# Patient Record
Sex: FEMALE | Race: WHITE | Employment: UNEMPLOYED | URBAN - METROPOLITAN AREA
[De-identification: names, ages, dates, MRNs, and addresses within clinical notes are randomized per-mention and may not be internally consistent; named-entity substitution may affect disease eponyms.]

---

## 2018-02-26 PROCEDURE — 88175 CYTOPATH C/V AUTO FLUID REDO: CPT | Performed by: OBSTETRICS & GYNECOLOGY

## 2019-07-15 ENCOUNTER — OFFICE VISIT (OUTPATIENT)
Dept: INTEGRATIVE MEDICINE | Facility: CLINIC | Age: 39
End: 2019-07-15
Payer: COMMERCIAL

## 2019-07-15 DIAGNOSIS — Z71.0 PERSON ENCOUNTERING HEALTH SERVICES TO CONSULT ON BEHALF OF ANOTHER PERSON: Primary | ICD-10-CM

## 2019-07-15 NOTE — PROGRESS NOTES
Patient here for consultation at no charge. Discussed practice philosophy as well as treatment approach in detail. Patient to follow up to establish as a patient in this practice if desired.

## 2021-03-21 ENCOUNTER — HOSPITAL ENCOUNTER (OUTPATIENT)
Age: 41
Discharge: HOME | End: 2021-03-21
Attending: EMERGENCY MEDICINE
Payer: COMMERCIAL

## 2021-03-21 VITALS
HEART RATE: 93 BPM | RESPIRATION RATE: 18 BRPM | DIASTOLIC BLOOD PRESSURE: 78 MMHG | SYSTOLIC BLOOD PRESSURE: 125 MMHG | OXYGEN SATURATION: 100 % | TEMPERATURE: 98 F

## 2021-03-21 DIAGNOSIS — J01.00 ACUTE NON-RECURRENT MAXILLARY SINUSITIS: Primary | ICD-10-CM

## 2021-03-21 LAB — SARS-COV-2 RNA RESP QL NAA+PROBE: NOT DETECTED

## 2021-03-21 PROCEDURE — 99203 OFFICE O/P NEW LOW 30 MIN: CPT

## 2021-03-21 PROCEDURE — 99213 OFFICE O/P EST LOW 20 MIN: CPT

## 2021-03-21 RX ORDER — AMOXICILLIN AND CLAVULANATE POTASSIUM 875; 125 MG/1; MG/1
1 TABLET, FILM COATED ORAL 2 TIMES DAILY
Qty: 20 TABLET | Refills: 0 | Status: SHIPPED | OUTPATIENT
Start: 2021-03-21 | End: 2021-03-21

## 2021-03-21 RX ORDER — AMOXICILLIN AND CLAVULANATE POTASSIUM 875; 125 MG/1; MG/1
1 TABLET, FILM COATED ORAL 2 TIMES DAILY
Qty: 20 TABLET | Refills: 0 | Status: SHIPPED | OUTPATIENT
Start: 2021-03-21 | End: 2021-03-31

## 2021-03-21 NOTE — ED PROVIDER NOTES
Patient Seen in: [facility]      History   Patient presents with:  Sinus Problem    Stated Complaint: TL - sinus infection, pressure in right ear. April K    HPI/Subjective:   HPI    42 yo female with chronic sinus symptoms due to allergies. Oropharynx is clear. No posterior oropharyngeal erythema. Eyes:      Conjunctiva/sclera: Conjunctivae normal.      Pupils: Pupils are equal, round, and reactive to light. Cardiovascular:      Rate and Rhythm: Normal rate and regular rhythm.    Pulmonary

## 2021-03-21 NOTE — ED INITIAL ASSESSMENT (HPI)
States she has a long history of seasonal allergies and sinus problems. 1 week of sinus congestion, frontal headache, and facial pressure. No fever. No dizziness. Taking sudafed with some relief. Declines covid testing at this time.

## 2021-06-08 ENCOUNTER — OFFICE VISIT (OUTPATIENT)
Dept: INTEGRATIVE MEDICINE | Facility: CLINIC | Age: 41
End: 2021-06-08
Payer: COMMERCIAL

## 2021-06-08 VITALS
HEIGHT: 61 IN | OXYGEN SATURATION: 98 % | HEART RATE: 90 BPM | SYSTOLIC BLOOD PRESSURE: 132 MMHG | BODY MASS INDEX: 28.17 KG/M2 | WEIGHT: 149.19 LBS | DIASTOLIC BLOOD PRESSURE: 80 MMHG

## 2021-06-08 DIAGNOSIS — Z00.00 ROUTINE MEDICAL EXAM: Primary | ICD-10-CM

## 2021-06-08 DIAGNOSIS — E66.3 OVERWEIGHT (BMI 25.0-29.9): ICD-10-CM

## 2021-06-08 DIAGNOSIS — J30.1 SEASONAL ALLERGIC RHINITIS DUE TO POLLEN: ICD-10-CM

## 2021-06-08 PROCEDURE — 3008F BODY MASS INDEX DOCD: CPT | Performed by: FAMILY MEDICINE

## 2021-06-08 PROCEDURE — 99386 PREV VISIT NEW AGE 40-64: CPT | Performed by: FAMILY MEDICINE

## 2021-06-08 PROCEDURE — 3075F SYST BP GE 130 - 139MM HG: CPT | Performed by: FAMILY MEDICINE

## 2021-06-08 PROCEDURE — 3079F DIAST BP 80-89 MM HG: CPT | Performed by: FAMILY MEDICINE

## 2021-06-08 NOTE — PATIENT INSTRUCTIONS
I have complete jodi in the body's ability to heal and transform. The products and items listed below (the “Products”)  and their claims have not been evaluated by the Food and Drug Administration.  Dietary products are not intended to treat, prevent, m Extract Nasal Spray:    Directions: 2 sprays three times daily  Why: to rebuild healthy roxanna in the nasal and sinus passages  Where: [employer] or online  YouTube: [employer], Yoga with Jean Claude Rodas, Yoga with Irene Fong exercis more!) that you are thankful for everyday. Life never seems as bad when you realize how much you have to be thankful for. · Focus on positive things in your life.  You are a powerful creator in your life and when you focus on the positive you will be yesy a total of four breaths. ARE THE NUMBERS IMPORTANT? The absolute time you spend on each phase is not important; the ratio of 4:7:8 is important.  If you have trouble holding your breath, speed the exercise up but keep to the ratio of 4:7:8 for the thr

## 2021-06-08 NOTE — PROGRESS NOTES
Ada Cantu is a 35year old female. Patient presents with:  New Patient  Establish Care      HPI:     Concerned about Candida. Has IBS. More sinus. IBS symptoms come and go. Takes probiotics which help. Acts up with stress or spicy foods.  Will Administered   • FLUMIST NASAL 2 YR-49 YRS (52729) 12/15/2014   • Influenza 10/08/2013   • Influenza Virus Vaccine, H1N1 (BAD=[id number]), [facility] 11/12/2009   • TDAP 01/30/2014       MEDICAL HISTORY:     Past Medical History:   Diagnosis Date   • SEASONAL A Physical Activity:       Days of Exercise per Week:       Minutes of Exercise per Session:   Stress:       Feeling of Stress :   Social Connections:       Frequency of Communication with Friends and Family:       Frequency of Social Gatherings with Friends Mental Status: She is alert and oriented to person, place, and time. Cranial Nerves: No cranial nerve deficit. Gait: Gait is intact.    Psychiatric:         Mood and Affect: Mood and affect normal.         Cognition and Memory: Memory normal. liable for the patients use of the Products. [facility] makes no representations or warranties of any kind, expressed or implied, as to the Products, including, but not limited to its efficacy, benefits or outcomes.   Patient agrees to contact his/her healthcare p in silence, meditation or deep breathing; If you can fit in taking a shower and brushing your teeth, then you can fit in this quiet time for yourself. · Find a special, quiet place to sit. · Start by sitting in a comfortable position. · Just BREATHE!  On present moment, I know this is a wonderful moment.”    Keep a gratitude journal of 5 things that you are grateful for every day.      [facility] one word to repeat out loud at first, then quieter and quieter until only mouthing th Stop, Breathe and Think  Pranayama  Insight Timer  Breathe to Relax  Calm  Simple Habit  10% Happier  [employer]    Check out these books:  Conscious Breathing: Breathwork for Health, Stress Release, and Personal Mastery by Flory Mitchell    The [employer]acle [employer] Mi 19.68

## 2021-06-15 ENCOUNTER — LAB ENCOUNTER (OUTPATIENT)
Dept: LAB | Facility: REFERENCE LAB | Age: 41
End: 2021-06-15
Attending: FAMILY MEDICINE
Payer: COMMERCIAL

## 2021-06-15 DIAGNOSIS — J30.1 SEASONAL ALLERGIC RHINITIS DUE TO POLLEN: ICD-10-CM

## 2021-06-15 DIAGNOSIS — Z00.00 ROUTINE MEDICAL EXAM: ICD-10-CM

## 2021-06-15 PROCEDURE — 85025 COMPLETE CBC W/AUTO DIFF WBC: CPT

## 2021-06-15 PROCEDURE — 80061 LIPID PANEL: CPT

## 2021-06-15 PROCEDURE — 80053 COMPREHEN METABOLIC PANEL: CPT

## 2021-06-15 PROCEDURE — 86628 CANDIDA ANTIBODY: CPT

## 2021-06-15 PROCEDURE — 84443 ASSAY THYROID STIM HORMONE: CPT

## 2021-06-15 PROCEDURE — 83036 HEMOGLOBIN GLYCOSYLATED A1C: CPT

## 2021-06-15 PROCEDURE — 82306 VITAMIN D 25 HYDROXY: CPT

## 2021-06-15 PROCEDURE — 36415 COLL VENOUS BLD VENIPUNCTURE: CPT

## 2021-06-23 ENCOUNTER — TELEPHONE (OUTPATIENT)
Dept: INTEGRATIVE MEDICINE | Facility: CLINIC | Age: 41
End: 2021-06-23

## 2021-06-23 NOTE — TELEPHONE ENCOUNTER
Pt is inquiring about her lab results completed last week and looking to see what the next best steps are. Pt is aware Dr. Anne Casey will respond as soon as possible. Scheduled pt for her follow up visit as well.  Pt wanted to complete the visit a little soone

## 2021-11-10 ENCOUNTER — TELEMEDICINE (OUTPATIENT)
Dept: INTEGRATIVE MEDICINE | Facility: CLINIC | Age: 41
End: 2021-11-10

## 2021-11-10 DIAGNOSIS — J30.1 SEASONAL ALLERGIC RHINITIS DUE TO POLLEN: Primary | ICD-10-CM

## 2021-11-10 DIAGNOSIS — N92.6 IRREGULAR PERIODS: ICD-10-CM

## 2021-11-10 DIAGNOSIS — K58.9 IRRITABLE BOWEL SYNDROME, UNSPECIFIED TYPE: ICD-10-CM

## 2021-11-10 PROCEDURE — 99214 OFFICE O/P EST MOD 30 MIN: CPT | Performed by: FAMILY MEDICINE

## 2021-11-10 NOTE — PROGRESS NOTES
Jannie Wong is a 35year old female. Patient presents with: Follow - Up      HPI:     Getting back into exercise, doing some yoga. Also running. Tried the natural nasal spray for her sinuses and it's been helping, not needing her Zyrtec.    Diges 2 months, then twice monthly thereafter.  120 mL 0   • FLUTICASONE PROPIONATE 50 MCG/ACT Nasal Suspension SPRAY TWICE INTO EACH NOSTRIL DAILY 1 Bottle 0       SOCIAL HISTORY:   Social History    Socioeconomic History      Marital status:       Spouse nerve deficit. Gait: Gait is intact.    Psychiatric:         Mood and Affect: Mood and affect normal.         Behavior: Behavior normal.         ASSESSMENT AND PLAN:     Office Visit on 09/09/2021   Component Date Value Ref Range Status   • DIAGNOSIS: Antibody to Candida                              albicans detected, which may                              indicate a current or past                              infection.     The best evidence for current infection is a significant   change on two approp certified laboratory and is intended for   clinical purposes. • Candida Antibody IgM 06/15/2021 0.49  <=0.88 EV Final    Comment: INTERPRETIVE INFORMATION: Candida Ab, IgM      0.88 EV or less: . ......  Negative - No significant level Calculated Osmolality 06/15/2021 288  275 - 295 mOsm/kg Final   • GFR, Non- 06/15/2021 108  >=60 Final   • GFR, -American 06/15/2021 125  >=60 Final   • ALT 06/15/2021 27  13 - 56 U/L Final   • AST 06/15/2021 19  15 - 37 U/L Final Interpretive Information:   An HDL cholesterol <40 mg/dL is low and constitutes a coronary heart disease risk factor. An HDL cholesterol >60 mg/dL is a negative risk factor for coronary heart disease.        • Triglycerides 06/15/2021 77  30 - 149 mg/dL Fin Lymphocyte Absolute 06/15/2021 2.74  1.00 - 4.00 x10(3) uL Final   • Monocyte Absolute 06/15/2021 0.42  0.10 - 1.00 x10(3) uL Final   • Eosinophil Absolute 06/15/2021 0.15  0.00 - 0.70 x10(3) uL Final   • Basophil Absolute 06/15/2021 0.04  0.00 - 0.20 x10(

## 2021-11-24 ENCOUNTER — TELEPHONE (OUTPATIENT)
Dept: INTEGRATIVE MEDICINE | Facility: CLINIC | Age: 41
End: 2021-11-24

## 2021-11-24 ENCOUNTER — HOSPITAL ENCOUNTER (OUTPATIENT)
Age: 41
Discharge: HOME | End: 2021-11-24
Payer: COMMERCIAL

## 2021-11-24 VITALS
SYSTOLIC BLOOD PRESSURE: 123 MMHG | BODY MASS INDEX: 26.43 KG/M2 | DIASTOLIC BLOOD PRESSURE: 82 MMHG | HEIGHT: 61 IN | RESPIRATION RATE: 20 BRPM | WEIGHT: 140 LBS | OXYGEN SATURATION: 99 % | HEART RATE: 100 BPM | TEMPERATURE: 98 F

## 2021-11-24 DIAGNOSIS — U07.1 COVID-19 VIRUS INFECTION: Primary | ICD-10-CM

## 2021-11-24 PROCEDURE — 87880 STREP A ASSAY W/OPTIC: CPT

## 2021-11-24 PROCEDURE — 99214 OFFICE O/P EST MOD 30 MIN: CPT

## 2021-11-24 RX ORDER — ACETAMINOPHEN 325 MG/1
650 TABLET ORAL
Qty: 40 TABLET | Refills: 0 | Status: SHIPPED | OUTPATIENT
Start: 2021-11-24

## 2021-11-24 NOTE — TELEPHONE ENCOUNTER
Left vm following with s/p antibody infusion. I will try to reach her on Friday. in the meantime, to go to ER or call 911 if any sob, CP, worst HA of her life.

## 2021-11-24 NOTE — TELEPHONE ENCOUNTER
Received adonay back from pt, feels like she has a cold - sore throat, HA, body aches. No fever. UC gave option for antibody infusion. Reviewed and discussed s/s to go to ER. Discussed symptomatic treatment. Caution with cough and cold OTC options - do not take additional tylenol. Currently taking tylenol and advil for symptoms. I will send supplement immune boost via [employer]. Aware we are out of the office tomorrow. Back in the morning Friday if needed. No further questions at this time.

## 2021-11-24 NOTE — TELEPHONE ENCOUNTER
Pt received PAB infusion at [address] on 11/24 for COVID-19. Please follow-up with pt for post-infusion assessment and home monitoring if needed. Thank you.

## 2021-11-24 NOTE — ED PROVIDER NOTES
Patient Seen in: [facility]    History   Patient presents with:  Sore Throat  Cough/URI    Stated Complaint: sore throat congestion    HPI    49-year-old female presents with chief complaint of sore throat. Onset 4 days ago.   Patient reports Alcohol use: Yes      Alcohol/week: 0.0 standard drinks      Comment: occasionally,    Drug use: No      Review of Systems    Positive for stated complaint: sore throat congestion  Other systems are as noted in HPI.   Constitutional and vital signs reviewed lymphadenopathy noted. Musculoskeletal: Musculoskeletal system is grossly intact. There is no obvious deformity. Neurological: Gross motor movement is intact in all 4 extremities. Patient exhibits normal speech. Skin: Skin is normal to inspection.   Wa following up with their doctor as instructed. The patient verbalized understanding of the discharge instructions and plan.     Disposition and Plan     Clinical Impression:  COVID-19 virus infection  (primary encounter diagnosis)    Disposition:  Discharge

## 2021-11-24 NOTE — ED INITIAL ASSESSMENT (HPI)
4 days of fatigue, \"not feeling well\", congestion, sore throat, and body aches. Patient is unvaccinated for covid.

## 2022-06-09 ENCOUNTER — OFFICE VISIT (OUTPATIENT)
Dept: INTEGRATIVE MEDICINE | Facility: CLINIC | Age: 42
End: 2022-06-09
Payer: COMMERCIAL

## 2022-06-09 VITALS
HEART RATE: 76 BPM | WEIGHT: 144 LBS | HEIGHT: 61 IN | DIASTOLIC BLOOD PRESSURE: 80 MMHG | BODY MASS INDEX: 27.19 KG/M2 | OXYGEN SATURATION: 99 % | SYSTOLIC BLOOD PRESSURE: 128 MMHG

## 2022-06-09 DIAGNOSIS — K58.9 IRRITABLE BOWEL SYNDROME, UNSPECIFIED TYPE: ICD-10-CM

## 2022-06-09 DIAGNOSIS — E66.3 OVERWEIGHT (BMI 25.0-29.9): ICD-10-CM

## 2022-06-09 DIAGNOSIS — Z00.00 ROUTINE MEDICAL EXAM: Primary | ICD-10-CM

## 2022-06-09 DIAGNOSIS — J30.1 SEASONAL ALLERGIC RHINITIS DUE TO POLLEN: ICD-10-CM

## 2022-06-09 PROCEDURE — 3008F BODY MASS INDEX DOCD: CPT | Performed by: FAMILY MEDICINE

## 2022-06-09 PROCEDURE — 3079F DIAST BP 80-89 MM HG: CPT | Performed by: FAMILY MEDICINE

## 2022-06-09 PROCEDURE — 3074F SYST BP LT 130 MM HG: CPT | Performed by: FAMILY MEDICINE

## 2022-06-09 PROCEDURE — 99396 PREV VISIT EST AGE 40-64: CPT | Performed by: FAMILY MEDICINE

## 2022-06-11 ENCOUNTER — LAB ENCOUNTER (OUTPATIENT)
Dept: LAB | Facility: REFERENCE LAB | Age: 42
End: 2022-06-11
Attending: FAMILY MEDICINE
Payer: COMMERCIAL

## 2022-06-11 DIAGNOSIS — Z00.00 ROUTINE MEDICAL EXAM: ICD-10-CM

## 2022-06-11 LAB
ALBUMIN SERPL-MCNC: 3.6 G/DL (ref 3.4–5)
ALBUMIN/GLOB SERPL: 1.1 {RATIO} (ref 1–2)
ALP LIVER SERPL-CCNC: 60 U/L
ALT SERPL-CCNC: 28 U/L
ANION GAP SERPL CALC-SCNC: 8 MMOL/L (ref 0–18)
AST SERPL-CCNC: 17 U/L (ref 15–37)
BASOPHILS # BLD AUTO: 0.03 X10(3) UL (ref 0–0.2)
BASOPHILS NFR BLD AUTO: 0.4 %
BILIRUB SERPL-MCNC: 0.8 MG/DL (ref 0.1–2)
BUN BLD-MCNC: 11 MG/DL (ref 7–18)
BUN/CREAT SERPL: 15.5 (ref 10–20)
CALCIUM BLD-MCNC: 8.9 MG/DL (ref 8.5–10.1)
CHLORIDE SERPL-SCNC: 107 MMOL/L (ref 98–112)
CHOLEST SERPL-MCNC: 208 MG/DL (ref ?–200)
CO2 SERPL-SCNC: 27 MMOL/L (ref 21–32)
CREAT BLD-MCNC: 0.71 MG/DL
DEPRECATED RDW RBC AUTO: 41 FL (ref 35.1–46.3)
EOSINOPHIL # BLD AUTO: 0.07 X10(3) UL (ref 0–0.7)
EOSINOPHIL NFR BLD AUTO: 1 %
ERYTHROCYTE [DISTWIDTH] IN BLOOD BY AUTOMATED COUNT: 11.7 % (ref 11–15)
EST. AVERAGE GLUCOSE BLD GHB EST-MCNC: 108 MG/DL (ref 68–126)
FASTING PATIENT LIPID ANSWER: YES
FASTING STATUS PATIENT QL REPORTED: YES
GLOBULIN PLAS-MCNC: 3.3 G/DL (ref 2.8–4.4)
GLUCOSE BLD-MCNC: 89 MG/DL (ref 70–99)
HBA1C MFR BLD: 5.4 % (ref ?–5.7)
HCT VFR BLD AUTO: 42.1 %
HDLC SERPL-MCNC: 71 MG/DL (ref 40–59)
HGB BLD-MCNC: 13.7 G/DL
IMM GRANULOCYTES # BLD AUTO: 0.02 X10(3) UL (ref 0–1)
IMM GRANULOCYTES NFR BLD: 0.3 %
LDLC SERPL CALC-MCNC: 123 MG/DL (ref ?–100)
LYMPHOCYTES # BLD AUTO: 2.76 X10(3) UL (ref 1–4)
LYMPHOCYTES NFR BLD AUTO: 40.6 %
MCH RBC QN AUTO: 31.1 PG (ref 26–34)
MCHC RBC AUTO-ENTMCNC: 32.5 G/DL (ref 31–37)
MCV RBC AUTO: 95.7 FL
MONOCYTES # BLD AUTO: 0.45 X10(3) UL (ref 0.1–1)
MONOCYTES NFR BLD AUTO: 6.6 %
NEUTROPHILS # BLD AUTO: 3.47 X10 (3) UL (ref 1.5–7.7)
NEUTROPHILS # BLD AUTO: 3.47 X10(3) UL (ref 1.5–7.7)
NEUTROPHILS NFR BLD AUTO: 51.1 %
NONHDLC SERPL-MCNC: 137 MG/DL (ref ?–130)
OSMOLALITY SERPL CALC.SUM OF ELEC: 293 MOSM/KG (ref 275–295)
PLATELET # BLD AUTO: 299 10(3)UL (ref 150–450)
POTASSIUM SERPL-SCNC: 4.2 MMOL/L (ref 3.5–5.1)
PROT SERPL-MCNC: 6.9 G/DL (ref 6.4–8.2)
RBC # BLD AUTO: 4.4 X10(6)UL
SODIUM SERPL-SCNC: 142 MMOL/L (ref 136–145)
TRIGL SERPL-MCNC: 80 MG/DL (ref 30–149)
TSI SER-ACNC: 1.35 MIU/ML (ref 0.36–3.74)
VIT D+METAB SERPL-MCNC: 66.4 NG/ML (ref 30–100)
VLDLC SERPL CALC-MCNC: 14 MG/DL (ref 0–30)
WBC # BLD AUTO: 6.8 X10(3) UL (ref 4–11)

## 2022-06-11 PROCEDURE — 36415 COLL VENOUS BLD VENIPUNCTURE: CPT

## 2022-06-11 PROCEDURE — 86628 CANDIDA ANTIBODY: CPT

## 2022-06-11 PROCEDURE — 80061 LIPID PANEL: CPT

## 2022-06-11 PROCEDURE — 85025 COMPLETE CBC W/AUTO DIFF WBC: CPT

## 2022-06-11 PROCEDURE — 80053 COMPREHEN METABOLIC PANEL: CPT

## 2022-06-11 PROCEDURE — 82306 VITAMIN D 25 HYDROXY: CPT

## 2022-06-11 PROCEDURE — 83036 HEMOGLOBIN GLYCOSYLATED A1C: CPT

## 2022-06-11 PROCEDURE — 84443 ASSAY THYROID STIM HORMONE: CPT

## 2022-06-15 LAB
CANDIDA ANTIBODY IGA: 0.4 EV
CANDIDA ANTIBODY IGG: 1.15 EV
CANDIDA ANTIBODY IGM: 0.42 EV

## 2022-11-30 ENCOUNTER — TELEMEDICINE (OUTPATIENT)
Dept: FAMILY MEDICINE CLINIC | Facility: CLINIC | Age: 42
End: 2022-11-30
Payer: COMMERCIAL

## 2022-11-30 DIAGNOSIS — J01.00 ACUTE MAXILLARY SINUSITIS, RECURRENCE NOT SPECIFIED: Primary | ICD-10-CM

## 2022-11-30 RX ORDER — AMOXICILLIN AND CLAVULANATE POTASSIUM 875; 125 MG/1; MG/1
1 TABLET, FILM COATED ORAL 2 TIMES DAILY
Qty: 14 TABLET | Refills: 0 | Status: SHIPPED | OUTPATIENT
Start: 2022-11-30 | End: 2022-12-07

## 2023-01-01 ENCOUNTER — HOSPITAL ENCOUNTER (OUTPATIENT)
Age: 43
Discharge: HOME | End: 2023-01-01
Attending: EMERGENCY MEDICINE
Payer: COMMERCIAL

## 2023-01-01 VITALS
DIASTOLIC BLOOD PRESSURE: 78 MMHG | RESPIRATION RATE: 18 BRPM | TEMPERATURE: 98 F | HEART RATE: 88 BPM | SYSTOLIC BLOOD PRESSURE: 127 MMHG | OXYGEN SATURATION: 100 %

## 2023-01-01 DIAGNOSIS — N30.01 ACUTE CYSTITIS WITH HEMATURIA: Primary | ICD-10-CM

## 2023-01-01 LAB
B-HCG UR QL: NEGATIVE
BILIRUB UR QL STRIP: NEGATIVE
COLOR UR: YELLOW
GLUCOSE UR STRIP-MCNC: NEGATIVE MG/DL
KETONES UR STRIP-MCNC: NEGATIVE MG/DL
NITRITE UR QL STRIP: NEGATIVE
PH UR STRIP: 8.5 [PH]
PROT UR STRIP-MCNC: 30 MG/DL
SP GR UR STRIP: 1.02
UROBILINOGEN UR STRIP-ACNC: <2 MG/DL

## 2023-01-01 PROCEDURE — 81002 URINALYSIS NONAUTO W/O SCOPE: CPT

## 2023-01-01 PROCEDURE — 81025 URINE PREGNANCY TEST: CPT

## 2023-01-01 PROCEDURE — 99214 OFFICE O/P EST MOD 30 MIN: CPT

## 2023-01-01 PROCEDURE — 99213 OFFICE O/P EST LOW 20 MIN: CPT

## 2023-01-01 RX ORDER — PHENAZOPYRIDINE HYDROCHLORIDE 200 MG/1
200 TABLET, FILM COATED ORAL 3 TIMES DAILY PRN
Qty: 6 TABLET | Refills: 0 | Status: SHIPPED | OUTPATIENT
Start: 2023-01-01 | End: 2023-01-03

## 2023-01-01 RX ORDER — SULFAMETHOXAZOLE AND TRIMETHOPRIM 800; 160 MG/1; MG/1
1 TABLET ORAL 2 TIMES DAILY
Qty: 6 TABLET | Refills: 0 | Status: SHIPPED | OUTPATIENT
Start: 2023-01-01 | End: 2023-01-04

## 2023-06-05 ENCOUNTER — OFFICE VISIT (OUTPATIENT)
Dept: INTEGRATIVE MEDICINE | Facility: CLINIC | Age: 43
End: 2023-06-05
Payer: COMMERCIAL

## 2023-06-05 VITALS
HEART RATE: 63 BPM | OXYGEN SATURATION: 98 % | DIASTOLIC BLOOD PRESSURE: 80 MMHG | BODY MASS INDEX: 28 KG/M2 | SYSTOLIC BLOOD PRESSURE: 116 MMHG | WEIGHT: 146 LBS

## 2023-06-05 DIAGNOSIS — J30.1 SEASONAL ALLERGIC RHINITIS DUE TO POLLEN: ICD-10-CM

## 2023-06-05 DIAGNOSIS — B37.82 INTESTINAL CANDIDIASIS: ICD-10-CM

## 2023-06-05 DIAGNOSIS — Z00.00 ROUTINE MEDICAL EXAM: Primary | ICD-10-CM

## 2023-06-05 RX ORDER — METRONIDAZOLE 10 MG/G
GEL TOPICAL
COMMUNITY
Start: 2023-04-18

## 2023-06-05 RX ORDER — KETOCONAZOLE 20 MG/ML
SHAMPOO TOPICAL
COMMUNITY
Start: 2023-04-17

## 2023-06-19 ENCOUNTER — LAB ENCOUNTER (OUTPATIENT)
Dept: LAB | Facility: REFERENCE LAB | Age: 43
End: 2023-06-19
Attending: FAMILY MEDICINE
Payer: COMMERCIAL

## 2023-06-19 DIAGNOSIS — B37.82 INTESTINAL CANDIDIASIS: ICD-10-CM

## 2023-06-19 DIAGNOSIS — Z00.00 ROUTINE MEDICAL EXAM: ICD-10-CM

## 2023-06-19 LAB
ALBUMIN SERPL-MCNC: 3.6 G/DL (ref 3.4–5)
ALBUMIN/GLOB SERPL: 1.1 {RATIO} (ref 1–2)
ALP LIVER SERPL-CCNC: 44 U/L
ALT SERPL-CCNC: 26 U/L
ANION GAP SERPL CALC-SCNC: 4 MMOL/L (ref 0–18)
AST SERPL-CCNC: 18 U/L (ref 15–37)
BASOPHILS # BLD AUTO: 0.04 X10(3) UL (ref 0–0.2)
BASOPHILS NFR BLD AUTO: 0.6 %
BILIRUB SERPL-MCNC: 0.7 MG/DL (ref 0.1–2)
BUN BLD-MCNC: 14 MG/DL (ref 7–18)
BUN/CREAT SERPL: 18.2 (ref 10–20)
CALCIUM BLD-MCNC: 8.7 MG/DL (ref 8.5–10.1)
CHLORIDE SERPL-SCNC: 108 MMOL/L (ref 98–112)
CHOLEST SERPL-MCNC: 188 MG/DL (ref ?–200)
CO2 SERPL-SCNC: 27 MMOL/L (ref 21–32)
CREAT BLD-MCNC: 0.77 MG/DL
DEPRECATED RDW RBC AUTO: 41.2 FL (ref 35.1–46.3)
EOSINOPHIL # BLD AUTO: 0.12 X10(3) UL (ref 0–0.7)
EOSINOPHIL NFR BLD AUTO: 1.9 %
ERYTHROCYTE [DISTWIDTH] IN BLOOD BY AUTOMATED COUNT: 11.9 % (ref 11–15)
EST. AVERAGE GLUCOSE BLD GHB EST-MCNC: 100 MG/DL (ref 68–126)
FASTING PATIENT LIPID ANSWER: YES
FASTING STATUS PATIENT QL REPORTED: YES
GFR SERPLBLD BASED ON 1.73 SQ M-ARVRAT: 97 ML/MIN/1.73M2 (ref 60–?)
GLOBULIN PLAS-MCNC: 3.4 G/DL (ref 2.8–4.4)
GLUCOSE BLD-MCNC: 90 MG/DL (ref 70–99)
HBA1C MFR BLD: 5.1 % (ref ?–5.7)
HCT VFR BLD AUTO: 42 %
HDLC SERPL-MCNC: 57 MG/DL (ref 40–59)
HGB BLD-MCNC: 13.6 G/DL
IMM GRANULOCYTES # BLD AUTO: 0.01 X10(3) UL (ref 0–1)
IMM GRANULOCYTES NFR BLD: 0.2 %
LDLC SERPL CALC-MCNC: 119 MG/DL (ref ?–100)
LYMPHOCYTES # BLD AUTO: 2.78 X10(3) UL (ref 1–4)
LYMPHOCYTES NFR BLD AUTO: 44.1 %
MCH RBC QN AUTO: 30.4 PG (ref 26–34)
MCHC RBC AUTO-ENTMCNC: 32.4 G/DL (ref 31–37)
MCV RBC AUTO: 94 FL
MONOCYTES # BLD AUTO: 0.48 X10(3) UL (ref 0.1–1)
MONOCYTES NFR BLD AUTO: 7.6 %
NEUTROPHILS # BLD AUTO: 2.88 X10 (3) UL (ref 1.5–7.7)
NEUTROPHILS # BLD AUTO: 2.88 X10(3) UL (ref 1.5–7.7)
NEUTROPHILS NFR BLD AUTO: 45.6 %
NONHDLC SERPL-MCNC: 131 MG/DL (ref ?–130)
OSMOLALITY SERPL CALC.SUM OF ELEC: 288 MOSM/KG (ref 275–295)
PLATELET # BLD AUTO: 212 10(3)UL (ref 150–450)
POTASSIUM SERPL-SCNC: 4.5 MMOL/L (ref 3.5–5.1)
PROT SERPL-MCNC: 7 G/DL (ref 6.4–8.2)
RBC # BLD AUTO: 4.47 X10(6)UL
SODIUM SERPL-SCNC: 139 MMOL/L (ref 136–145)
TRIGL SERPL-MCNC: 63 MG/DL (ref 30–149)
TSI SER-ACNC: 1.15 MIU/ML (ref 0.36–3.74)
VIT D+METAB SERPL-MCNC: 65.3 NG/ML (ref 30–100)
VLDLC SERPL CALC-MCNC: 11 MG/DL (ref 0–30)
WBC # BLD AUTO: 6.3 X10(3) UL (ref 4–11)

## 2023-06-19 PROCEDURE — 36415 COLL VENOUS BLD VENIPUNCTURE: CPT

## 2023-06-19 PROCEDURE — 84443 ASSAY THYROID STIM HORMONE: CPT

## 2023-06-19 PROCEDURE — 86628 CANDIDA ANTIBODY: CPT

## 2023-06-19 PROCEDURE — 83036 HEMOGLOBIN GLYCOSYLATED A1C: CPT

## 2023-06-19 PROCEDURE — 85025 COMPLETE CBC W/AUTO DIFF WBC: CPT

## 2023-06-19 PROCEDURE — 80061 LIPID PANEL: CPT

## 2023-06-19 PROCEDURE — 80053 COMPREHEN METABOLIC PANEL: CPT

## 2023-06-19 PROCEDURE — 82306 VITAMIN D 25 HYDROXY: CPT

## 2023-06-21 LAB
CANDIDA IGA AB: NEGATIVE
CANDIDA IGM AB IGM: NEGATIVE

## 2023-06-28 ENCOUNTER — PATIENT MESSAGE (OUTPATIENT)
Dept: INTEGRATIVE MEDICINE | Facility: CLINIC | Age: 43
End: 2023-06-28

## 2024-04-02 ENCOUNTER — PATIENT MESSAGE (OUTPATIENT)
Dept: FAMILY MEDICINE CLINIC | Facility: CLINIC | Age: 44
End: 2024-04-02

## 2024-04-02 DIAGNOSIS — Z12.11 COLON CANCER SCREENING: Primary | ICD-10-CM

## 2024-04-15 ENCOUNTER — NURSE ONLY (OUTPATIENT)
Facility: CLINIC | Age: 44
End: 2024-04-15

## 2024-04-15 DIAGNOSIS — Z12.11 SCREEN FOR COLON CANCER: Primary | ICD-10-CM

## 2024-04-15 RX ORDER — MULTIVIT WITH MINERALS/LUTEIN
250 TABLET ORAL DAILY
COMMUNITY

## 2024-04-15 RX ORDER — ACETAMINOPHEN 160 MG
1 TABLET,DISINTEGRATING ORAL DAILY
COMMUNITY

## 2024-04-15 RX ORDER — FLUTICASONE PROPIONATE 50 MCG
1 SPRAY, SUSPENSION (ML) NASAL DAILY
COMMUNITY

## 2024-04-15 RX ORDER — MULTIVIT-MIN/IRON FUM/FOLIC AC 7.5 MG-4
1 TABLET ORAL DAILY
COMMUNITY

## 2024-04-15 RX ORDER — GARLIC EXTRACT 500 MG
1 CAPSULE ORAL DAILY
COMMUNITY

## 2024-04-15 NOTE — PROGRESS NOTES
GI Staff:  [facility] Colon Screening Orders    Please schedule: Colonoscopy 22682 / [id number] with MAC OR IV (if appropriate)    Please send split dose Golytely bowel prep     Diagnosis: Colon Screening Z12.11     Medication adjustments:  Day before procedure, hold: none (patient taking OTC medications, need to hold 1 week before)  Day of procedure, hold: none    >>>Please inform patient if new medications are started after scheduling procedure they need to call clinic to notify us.

## 2024-04-15 NOTE — PROGRESS NOTES
Called patient for scheduled telephone colon screening  Medications, pharmacy, and allergies verified      › GI MD preference: Dr Womack  › Insurance:  [employer]  › Last PCP Visit: 6/5/2023  › Last CBC drawn: 6/19/2023  › H/W/BMI: 5'1/133 lbs/ 25.13    Special comments/notes:    Telephone colon screening Questionnaires:  Yes No   Are you currently experiencing any new GI symptoms? [] [x]   If yes, symptom details:     Rectal Bleeding with or without bowel movements: [] [x]   Black stool: [] [x]   Dysphagia &Food feeling/getting stuck: [] [x]   Intractable Vomiting: [] [x]   Unexplained weight loss: [] [x]   First colonoscopy? [x] []   Family history of colon cancer? [] [x]   Any issues with anesthesia? [] [x]   If yes, explain:      Personal history of Resp. Issues/Oxygen Use/DANITA/COPD? [] [x]   If yes, CPAP/BiPAP? [] []   History of devices Pacemaker/Defibrillator/Stents? [] [x]   History of Cardiac/CVA issues/(MI/Stroke):  [] [x]     Medication usage:  Yes  No   Anticoagulants:  Anticoagulant (Except Aspirin) ? Route to RN staff to obtain ordering provider orders [] [x]   Diabetic Meds:   PO DM Meds ? hold day prior and day of procedure  Insulin ? Route to RN clinical staff to obtain provider orders  [] [x]   Weight loss meds (Phentermine/Vyvanse/Saxsenda/etc): [] [x]   Iron/Herbal/Multivitamin Supplement (RX/OTC): Vit C,zinc, Vit D3, quercitin [x] []   Usage of marijuana, CBD &/or vape products: [] [x]

## 2024-04-16 RX ORDER — SODIUM, POTASSIUM,MAG SULFATES 17.5-3.13G
SOLUTION, RECONSTITUTED, ORAL ORAL
Qty: 1 EACH | Refills: 0 | Status: SHIPPED | OUTPATIENT
Start: 2024-04-16

## 2024-04-16 NOTE — PROGRESS NOTES
Thanks all.    Recent PCP visit Harrison Blanco DO 6/5/2023 reviewed.  Healthy.    Medications reviewed    GI schedulers -    Please call Ms. Tony to schedule colonoscopy exam at [facility] ([facility])    BMI Readings from Last 1 Encounters:   06/05/23 27.59 kg/m²     MAC anesthesia     BP Readings from Last 5 Encounters:   06/05/23 116/80   01/01/23 127/78   06/09/22 128/80   11/24/21 123/82   09/09/21 130/82       Suprep small volume bowel prep if covered by insurance, otherwise   Golytely (PEG) 4L bowel prep  Rx sent in to [facility]      DX = screening colonoscopy examination    Medication instructions:    None

## 2024-04-17 NOTE — PROGRESS NOTES
Scheduled for:  Colonoscopy [id number]  Provider Name:  Dr. Johnston   Date:  Thursday, 09/12/2024  Location:  [facility]   Sedation:  Mac  Time:  12:30 pm (pt is aware [facility] will call with arrival time     Prep:  Golytely   Meds/Allergies Reconciled?:  Yes    Diagnosis with codes:  screening colonoscopy examination Z12.11  Was patient informed to call insurance with codes (Y/N):  Yes     Referral sent?:  Referral was sent at the time of electronic surgical scheduling.    [facility] or [facility] notified?:  I sent an electronic request to Endo Scheduling and received a confirmation today.       Medication Orders:  Day before procedure, hold: none (patient taking OTC medications, need to hold 1 week before)  Day of procedure, hold: none     Misc Orders:  None     Further instructions given by staff:  I discussed the prep instructions with the patient which she verbally understood and is aware that I will send the instructions today.via [employer]

## 2024-06-12 ENCOUNTER — OFFICE VISIT (OUTPATIENT)
Dept: FAMILY MEDICINE CLINIC | Facility: CLINIC | Age: 44
End: 2024-06-12
Payer: COMMERCIAL

## 2024-06-12 VITALS
HEART RATE: 77 BPM | BODY MASS INDEX: 25.14 KG/M2 | TEMPERATURE: 98 F | WEIGHT: 133.19 LBS | HEIGHT: 61 IN | SYSTOLIC BLOOD PRESSURE: 120 MMHG | OXYGEN SATURATION: 97 % | DIASTOLIC BLOOD PRESSURE: 80 MMHG

## 2024-06-12 DIAGNOSIS — Z13.9 ENCOUNTER FOR SCREENING: ICD-10-CM

## 2024-06-12 DIAGNOSIS — Z23 ENCOUNTER FOR IMMUNIZATION: ICD-10-CM

## 2024-06-12 DIAGNOSIS — Z12.11 SCREENING FOR COLON CANCER: ICD-10-CM

## 2024-06-12 DIAGNOSIS — E78.00 HYPERCHOLESTEROLEMIA: ICD-10-CM

## 2024-06-12 DIAGNOSIS — Z00.00 ROUTINE MEDICAL EXAM: Primary | ICD-10-CM

## 2024-06-12 DIAGNOSIS — Z71.85 IMMUNIZATION COUNSELING: ICD-10-CM

## 2024-06-12 PROCEDURE — 99396 PREV VISIT EST AGE 40-64: CPT | Performed by: STUDENT IN AN ORGANIZED HEALTH CARE EDUCATION/TRAINING PROGRAM

## 2024-06-12 NOTE — PROGRESS NOTES
Subjective:   Qi Tony is a 44 year old female who presents for Physical (Elizabeth )     44-year-old female, previous patient of Dr. Blanco coming in to establish care for routine physical.  Follows up with GYN for female health matters.  Already has colonoscopy scheduled in September.  Works out 5 days a week by doing 30 to 40 minutes of power walking on an incline as well as occasional running.  Also does weights.  Patient would also like to get Candida testing as she has previously done so through integrative medicine.  Denies any family history of breast or colon cancer.    History/Other:    Chief Complaint Reviewed and Verified  Nursing Notes Reviewed and   Verified  Tobacco Reviewed  Allergies Reviewed  Medications Reviewed    Medical History Reviewed  Surgical History Reviewed  Family History   Reviewed  Social History Reviewed         Tobacco:  She smoked tobacco in the past but quit greater than 12 months ago.  Social History     Tobacco Use   Smoking Status Former    Current packs/day: 0.00    Types: Cigarettes    Quit date: 1997    Years since quittin.4   Smokeless Tobacco Never   Tobacco Comments    QUIT 10 YEARS AGO        Current Outpatient Medications   Medication Sig Dispense Refill    Na Sulfate-K Sulfate-Mg Sulf (SUPREP BOWEL PREP KIT) 17.5-3.13-1.6 GM/177ML Oral Solution Take as directed 1 each 0    Multiple Vitamins-Minerals (MULTI-VITAMIN/MINERALS) Oral Tab Take 1 tablet by mouth daily.      ZINC GLYCINATE OR Take 30 mg by mouth daily.      Omega-3 Fatty Acids (OMEGA-3 FISH OIL OR) Take 690 mg by mouth daily.      cholecalciferol 50 MCG ( UT) Oral Cap Take 1 capsule by mouth daily.      ascorbic acid 250 MG Oral Tab Take 1 tablet (250 mg total) by mouth daily.      Bioflavonoid Products (QUERCETIN COMPLEX IMMUNE OR) Take 1 capsule by mouth As Directed. Takes 2-4 times a week      acidophilus-pectin Oral Cap Take 1 capsule by mouth daily.      fluticasone  propionate 50 MCG/ACT Nasal Suspension 1 spray by Nasal route daily.           Review of Systems:  Review of Systems   Constitutional:  Negative for chills, diaphoresis and fever.   HENT:  Negative for congestion, ear discharge, ear pain, sinus pressure, sinus pain and sore throat.    Eyes:  Negative for pain and discharge.   Respiratory:  Negative for cough, chest tightness, shortness of breath and wheezing.    Cardiovascular:  Negative for chest pain and palpitations.   Gastrointestinal:  Negative for abdominal pain, diarrhea, nausea and vomiting.   Endocrine: Negative for cold intolerance and heat intolerance.   Genitourinary:  Negative for dysuria, flank pain, frequency and urgency.   Musculoskeletal:  Negative for joint swelling.   Skin:  Negative for rash.   Neurological:  Negative for dizziness, syncope and headaches.   Psychiatric/Behavioral:  Negative for confusion and hallucinations.        Objective:   /80   Pulse 77   Temp 97.8 °F (36.6 °C)   Ht 5' 1\" (1.549 m)   Wt 133 lb 3.2 oz (60.4 kg)   SpO2 97%   BMI 25.17 kg/m²  Estimated body mass index is 25.17 kg/m² as calculated from the following:    Height as of this encounter: 5' 1\" (1.549 m).    Weight as of this encounter: 133 lb 3.2 oz (60.4 kg).  Physical Exam  Constitutional:       General: She is not in acute distress.     Appearance: Normal appearance. She is not ill-appearing or toxic-appearing.   HENT:      Head: Normocephalic and atraumatic.      Right Ear: Tympanic membrane and ear canal normal.      Left Ear: Tympanic membrane and ear canal normal.      Mouth/Throat:      Mouth: Mucous membranes are moist.      Pharynx: Oropharynx is clear. No oropharyngeal exudate or posterior oropharyngeal erythema.   Eyes:      Extraocular Movements: Extraocular movements intact.      Pupils: Pupils are equal, round, and reactive to light.   Cardiovascular:      Rate and Rhythm: Normal rate and regular rhythm.      Heart sounds: Normal heart  sounds. No murmur heard.     No gallop.   Pulmonary:      Effort: Pulmonary effort is normal. No respiratory distress.      Breath sounds: Normal breath sounds. No stridor. No wheezing, rhonchi or rales.   Abdominal:      General: Bowel sounds are normal.      Palpations: Abdomen is soft.      Tenderness: There is no abdominal tenderness. There is no right CVA tenderness, left CVA tenderness or guarding.   Musculoskeletal:         General: No swelling.      Cervical back: Normal range of motion and neck supple. No rigidity or tenderness.      Right lower leg: No edema.      Left lower leg: No edema.   Skin:     General: Skin is warm and dry.   Neurological:      General: No focal deficit present.      Mental Status: She is alert and oriented to person, place, and time. Mental status is at baseline.      Cranial Nerves: No cranial nerve deficit.      Sensory: No sensory deficit.      Motor: Motor function is intact. No weakness.      Gait: Gait normal.   Psychiatric:         Mood and Affect: Mood normal.         Behavior: Behavior normal.         Thought Content: Thought content normal.         Judgment: Judgment normal.         Assessment & Plan:   1. Routine medical exam (Primary)  -Healthy diet and lifestyle.  -Weight loss.  -Continue with exercise as tolerated.  -Dental exam every 6 months or as recommended by dentist.  -Eye exams annually, at least every 2 years or as recommended by specialist.  -     CBC, Platelet; No Differential; Future; Expected date: 06/12/2024  -     Comp Metabolic Panel (14); Future; Expected date: 06/12/2024  -     Hemoglobin A1C; Future; Expected date: 06/12/2024  -     Lipid Panel; Future; Expected date: 06/12/2024  -     TSH W Reflex To Free T4; Future; Expected date: 06/12/2024  -     Vitamin D; Future; Expected date: 06/14/2024  2. Hypercholesterolemia  The patient's ASCVD 10 year risk score is 0.7.  -Discussed CT calcium score of the heart with patient.  -     Lipid Panel; Future;  Expected date: 06/12/2024  3. Screening for colon cancer  Has colonoscopy scheduled in September.  4. Encounter for screening  Discussed with patient that I do not follow/manage Candida testing.  Pending results to follow-up with integrative medicine.  -     Candida IgG, IgA, IgM Antibody Panel; Future; Expected date: 06/12/2024  5. Immunization counseling  Discussed Tdap vaccination with patient.  Patient will follow-up for administration.  -     TdaP (Boostrix) Vaccine (> 7 Y)  6. Encounter for immunization  Discussed Tdap vaccination with patient.  Patient will follow-up for administration.  -     Candida IgG, IgA, IgM Antibody Panel; Future; Expected date: 06/12/2024          Return in about 1 year (around 6/12/2025).    Albert Rick MD, 6/12/2024, 8:52 AM

## 2024-06-15 ENCOUNTER — LAB ENCOUNTER (OUTPATIENT)
Dept: LAB | Age: 44
End: 2024-06-15
Attending: STUDENT IN AN ORGANIZED HEALTH CARE EDUCATION/TRAINING PROGRAM

## 2024-06-15 DIAGNOSIS — Z00.00 ROUTINE MEDICAL EXAM: ICD-10-CM

## 2024-06-15 DIAGNOSIS — E78.00 HYPERCHOLESTEROLEMIA: ICD-10-CM

## 2024-06-15 DIAGNOSIS — Z23 ENCOUNTER FOR IMMUNIZATION: ICD-10-CM

## 2024-06-15 DIAGNOSIS — Z13.9 ENCOUNTER FOR SCREENING: ICD-10-CM

## 2024-06-15 LAB
ALBUMIN SERPL-MCNC: 4.2 G/DL (ref 3.2–4.8)
ALBUMIN/GLOB SERPL: 1.6 {RATIO} (ref 1–2)
ALP LIVER SERPL-CCNC: 54 U/L
ALT SERPL-CCNC: 18 U/L
ANION GAP SERPL CALC-SCNC: 6 MMOL/L (ref 0–18)
AST SERPL-CCNC: 15 U/L (ref ?–34)
BILIRUB SERPL-MCNC: 1.1 MG/DL (ref 0.3–1.2)
BUN BLD-MCNC: 13 MG/DL (ref 9–23)
BUN/CREAT SERPL: 16.5 (ref 10–20)
CALCIUM BLD-MCNC: 9.3 MG/DL (ref 8.7–10.4)
CHLORIDE SERPL-SCNC: 108 MMOL/L (ref 98–112)
CHOLEST SERPL-MCNC: 222 MG/DL (ref ?–200)
CO2 SERPL-SCNC: 27 MMOL/L (ref 21–32)
CREAT BLD-MCNC: 0.79 MG/DL
DEPRECATED RDW RBC AUTO: 41.1 FL (ref 35.1–46.3)
EGFRCR SERPLBLD CKD-EPI 2021: 93 ML/MIN/1.73M2 (ref 60–?)
ERYTHROCYTE [DISTWIDTH] IN BLOOD BY AUTOMATED COUNT: 11.9 % (ref 11–15)
EST. AVERAGE GLUCOSE BLD GHB EST-MCNC: 105 MG/DL (ref 68–126)
FASTING PATIENT LIPID ANSWER: YES
FASTING STATUS PATIENT QL REPORTED: YES
GLOBULIN PLAS-MCNC: 2.6 G/DL (ref 2–3.5)
GLUCOSE BLD-MCNC: 93 MG/DL (ref 70–99)
HBA1C MFR BLD: 5.3 % (ref ?–5.7)
HCT VFR BLD AUTO: 42.3 %
HDLC SERPL-MCNC: 74 MG/DL (ref 40–59)
HGB BLD-MCNC: 13.9 G/DL
LDLC SERPL CALC-MCNC: 137 MG/DL (ref ?–100)
MCH RBC QN AUTO: 31.2 PG (ref 26–34)
MCHC RBC AUTO-ENTMCNC: 32.9 G/DL (ref 31–37)
MCV RBC AUTO: 95.1 FL
NONHDLC SERPL-MCNC: 148 MG/DL (ref ?–130)
OSMOLALITY SERPL CALC.SUM OF ELEC: 292 MOSM/KG (ref 275–295)
PLATELET # BLD AUTO: 221 10(3)UL (ref 150–450)
POTASSIUM SERPL-SCNC: 4.5 MMOL/L (ref 3.5–5.1)
PROT SERPL-MCNC: 6.8 G/DL (ref 5.7–8.2)
RBC # BLD AUTO: 4.45 X10(6)UL
SODIUM SERPL-SCNC: 141 MMOL/L (ref 136–145)
TRIGL SERPL-MCNC: 63 MG/DL (ref 30–149)
TSI SER-ACNC: 1.4 MIU/ML (ref 0.55–4.78)
VIT D+METAB SERPL-MCNC: 73.8 NG/ML (ref 30–100)
VLDLC SERPL CALC-MCNC: 11 MG/DL (ref 0–30)
WBC # BLD AUTO: 6.5 X10(3) UL (ref 4–11)

## 2024-06-15 PROCEDURE — 83036 HEMOGLOBIN GLYCOSYLATED A1C: CPT

## 2024-06-15 PROCEDURE — 85027 COMPLETE CBC AUTOMATED: CPT

## 2024-06-15 PROCEDURE — 86628 CANDIDA ANTIBODY: CPT

## 2024-06-15 PROCEDURE — 80061 LIPID PANEL: CPT

## 2024-06-15 PROCEDURE — 82306 VITAMIN D 25 HYDROXY: CPT

## 2024-06-15 PROCEDURE — 36415 COLL VENOUS BLD VENIPUNCTURE: CPT

## 2024-06-15 PROCEDURE — 80053 COMPREHEN METABOLIC PANEL: CPT

## 2024-06-15 PROCEDURE — 84443 ASSAY THYROID STIM HORMONE: CPT

## 2024-06-18 LAB
CANDIDA IGA AB: NEGATIVE
CANDIDA IGG AB: POSITIVE
CANDIDA IGM AB IGM: NEGATIVE

## 2024-09-10 ENCOUNTER — TELEPHONE (OUTPATIENT)
Facility: CLINIC | Age: 44
End: 2024-09-10

## 2024-09-10 NOTE — TELEPHONE ENCOUNTER
I spoke to the pt    I gave her [facility] phone number [phone]    I let her know it is not our office that determines procedure times    Pt voices understanding

## 2024-09-10 NOTE — TELEPHONE ENCOUNTER
Patient calling to get time of procedure if possible, states due to daughter and school needs to arrange. Please call.

## 2024-09-12 PROCEDURE — 88305 TISSUE EXAM BY PATHOLOGIST: CPT | Performed by: INTERNAL MEDICINE

## 2024-09-23 ENCOUNTER — TELEPHONE (OUTPATIENT)
Facility: CLINIC | Age: 44
End: 2024-09-23

## 2024-09-23 NOTE — TELEPHONE ENCOUNTER
----- Message from Fernando Johnston sent at 9/22/2024  8:39 PM CDT -----  GI RNs - please recall for colonoscopy exam in 3yrs

## 2024-09-23 NOTE — TELEPHONE ENCOUNTER
Health maintenance updated.    3 year colonoscopy recall entered into patient outreach in [facility].  Next colonoscopy will be due 9/12/2027.

## 2024-09-25 ENCOUNTER — OFFICE VISIT (OUTPATIENT)
Dept: INTEGRATIVE MEDICINE | Facility: CLINIC | Age: 44
End: 2024-09-25
Payer: COMMERCIAL

## 2024-09-25 VITALS
HEART RATE: 70 BPM | BODY MASS INDEX: 24.99 KG/M2 | WEIGHT: 132.38 LBS | OXYGEN SATURATION: 98 % | DIASTOLIC BLOOD PRESSURE: 70 MMHG | SYSTOLIC BLOOD PRESSURE: 110 MMHG | HEIGHT: 61 IN

## 2024-09-25 DIAGNOSIS — N95.1 PERIMENOPAUSE: ICD-10-CM

## 2024-09-25 DIAGNOSIS — R53.83 OTHER FATIGUE: Primary | ICD-10-CM

## 2024-09-25 DIAGNOSIS — R10.9 ABDOMINAL DISTRESS: ICD-10-CM

## 2024-09-25 DIAGNOSIS — R14.0 BLOATING: ICD-10-CM

## 2024-09-25 PROCEDURE — 99215 OFFICE O/P EST HI 40 MIN: CPT | Performed by: PHYSICIAN ASSISTANT

## 2024-09-25 NOTE — PATIENT INSTRUCTIONS
Blood work during luteal phase     October 7-9 - fasting         helps alleviate minor discomforts associated with the menstrual cycle*  supports healthy progesterone levels*  helps moderate luteinizing hormone production*  PMS Support is a combination of herbal extracts formulated specifically to help women who experience common mild symptoms associated with their monthly menstrual cycle.* The herb Vitex acts on the pituitary gland, which modulates the production of luteinizing hormone.* This supports progesterone production and helps maintain a regular monthly cycle.* Dong Quai, another herb in this formula, acts to relax smooth muscles, relieving minor discomforts associated with the menstrual cycle.*           Suggested Use:  Take 2 vegan capsules daily, or as directed by your healthcare professional      Serving Size: 2 capsules    Amount Per Serving  Vitamin B6 … 50mg  (as pyridoxine HCl)  Chaste Tree Extract … 300mg  (Vitex agnus-castus, fruit, standardized to 0.5% agnusides)  Wild Yam Extract .. 250mg  (Dioscorea villosa, root, 10:1 ratio)  Dong Quai Extract … 250mg  (Tierra sinensis, root, standardized to 1% ligustilide)  Bupleurum Extract … 250mg  (Bupleurum falcatum, root, 10:1 ratio)  Passion Flower Extract … 150mg  (Passiflora incarnata, flower, standardized to 3.5% flavonoids)    Other Ingredients: Hypromellose, Leucine, Silica, Magnesium Citrate.      Store sealed in a cool dry place. Keep out of reach of children. Use only if safety seal is intact.     Warning: If you are pregnant or lactating, taking medication or have a medical condition, consult your healthcare practitioner before use.      Start ION gut support  Future we will probably add other micronutrients     Pay attention to high histamine foods   Does it make your feel bloated, anxious, sleep is upset  Pay attention to sleep the week before     Stomach acid test    First thing in the morning.  1/4 tsp baking soda + 4 ounces of  water.  You should burp within 3 minutes if you do not burp it means low stomach acid.     Histamine food list   In general, foods to AVOID:   Cultured or fermented foods - sauerkraut, kombucha, pickles, miso, kimchee  Yeast  Vinegar and foods containing vinegar - salad dressing, mustard, ketchup, mayonnaise  Chocolate and Cocoa  Soda and energy drinks  Canned foods  Preservatives and additives  Leftovers: very ripe, older or non-hygenic foods  Coffee, black and green tea  Spices/seasoning - anise, cinnamon, clove, nutmeg, chili powder, sanchez, hot peppers       Food group Low histamine Minimal High histamine    Protein  Poultry - chicken, duck, pheasant, turkey  -Organic, grass fed and skinless preferred  Frozen     Meat: beef, buffalo, elk, lamb, pork, venison  -Organic, grass fed preferred   frozen -Slow-cooked or leftover meat  -Processed meats: mccoy, sausage, deli meat, etc.  -Smoked or cured: ham, salami, pastrami -Factory-farmed, or with added sugar, MSG, sulfites, or carrageenan    Seafood: sustainable fished and wild caught   -gutted within 30 minutes  Flash-frozen depending upon how quickly fish was gutted -Shellfish  -aged fish (canned, smoked)  -fish not immediately gutted after catching   Eggs Eggs organic & free-range; fully cooked  Egg whites, raw or undercooked   Dairy  Milk  Yogurt    Rice Milk  Kefir- depending on culture used     Cream      Cream Cheese (except when cultured  Aged cheese     Cottage cheese     Fruit - Fresh  Apples (all varieties) Apricots   Blackberries, Blueberries   Cherries  Dates   Figs   Exotic fruits (star fruit, quince)   Grapes (both red & green)   Melon   Nectarines   Peaches   Pears (all varieties) Plums   Pomegranates Raspberries Watermelon   Non-citrus juices Dried Fruit Generally, any overripe fruit   Avocado  Bananas   Citrus fruits   Grapefruit  Kiwi  Lemon/Lime  [place]   Oranges  Papaya  Pineapple Strawberries  Tangerines     Vegetables  Anise/fennel root Artichoke    Arugula  Asparagus   Beets  [place] peppers   Bok [place]   Broccoli   [place] sprouts Cabbage   Carrots  Cauliflower  Celery   Cucumber   Eggplant   Garlic   Calista   Green beans  Greens (beet, siobhan, mustard, turnip) Herbs (leafy: basil, mint, parsley, oregano, rosemary, tarragon, thyme) Jicama   Kale   Kohlrabi   Leeks  Lettuce (laury, butter, red)   Mushrooms (all)   Okra   Onion (red)/Shallot Parsnips   Peas (snow, sugar snap)   Pumpkin   Radish   Rutabaga   Rhubarb   Sprouts   Squash (acorn, butternut, delicata, spaghetti, summer) Sweet Potato/Yam Swiss chard Turnip Watercress   Zucchini  Spinach   Tomato (fresh or processed)   Grains  [place]  Oats  Rice  Spelt  Pasta made from corn, rice, spelt  Wheat-based foods   Breads  Yeast-free: muffins & tortillas made from acceptable grains Euro-style rye bread     Sweeteners Agave Honey Maple syrup Non-GMO sugar     Beans Garbanzo  Black  Lentils   Red beans    Fats  - must be 100% grass-fed & organic Cooking Fats:  - Animal fats   -Clarified butter   -Ghee  -Coconut oil  -Extra-virgin Olive oil Eating Fats:   - Coconut   -butter  -Coconut flakes -Olives (all) Nuts & Seeds: Almonds   [place] butter  Brazil nuts   Pecans   Pistachios   -Flax   -Pine nuts   -Pumpkin seeds/ pepitas   -Sesame seeds   -Sunflower seeds   -Sunflower seed butter   -Walnuts Cashews  Coconut milk (canned)  Hazelnuts (Filberts) Macadamia nuts  Avocado   Drinks Herbal Tea   Non citrus fruit juice  water   Green tea   Clear spirits   White wine  Coffee  Black Tea  Soda  Wine   Beer

## 2024-09-25 NOTE — PROGRESS NOTES
Qi Tony is a 44 year old female.  Chief Complaint   Patient presents with    Establish Care     Patient presents to establish care. Candida results.        HPI:   Qi presents for initial evaluation, fatigue, hair loss, hormones, gut concerns   She previously saw Ann    Main concern:   May 1 2023 she has done an overall on her diet and exercise.     Thyroid: no known issue      She is concerned her hair is falling out.     Body/rash: joints are feeling find. Occasionally her knee will hurt its usually activity related.     Not waking up swollen now that she is working out and eating healthy    Fatigue - usually pretty good.   Mid day she will have mid afternoon crash     Hormones - started age 10. Normal they were painful not heavy    Now in her 40s she has had more clotting, sometimes heavy sometimes not.   Bleeding days has shortened. Starts heavy and more clotting. They are regular   Stress can affect periods     2 pregnancies - two living  First child 6 weeks premature.      No fertility issues     She does not think she had post partum     PMS - more irritable   She is not sure if her sleep is worse the week before  She is more headachy before her cycle     Last mammogram She has dense tissue nothing abbormal    Coloscopy - Prep was a challenge. She feels a little better after     GI - She was told she had IBS in her 20s. They believed it was brought on by stress. Daily probiotics helps. She does not have diarrhea. Sometimes it will be loose sometimes, constipation, bloating, burping. Some weeks it is great other weeks it is more bloating. Not a lot of acid reflux     She is not waking up bloated.     When she was young she did struggle a little with constipated but not constant     Mental state -   She feels her anxiety has increased in her life. Regular every day things. She has racing thought, cyclical thoughts.     Anxiety a few days a week. Usually about kids     Weight  History:   She has not struggled with her weight   2005 she was her heaviest. Never has she been that heavy. She things that it was food and birth control      May 1 2023 she was 154. She got down to 127    Childhood -   She had  parents age 9 or 10. There was a lot of stress. Traumatic things with that situation. No abuse      Trauma: no abuse, no major accidents    Antibiotic use  She had strep a lot as a child  Tonsils out at age 14.   In her 20s she would have acne she was placed on abx for about a year. IBS started around that time. She was getting yeast infections    She will get sinus infection one time a year     Pertinent medical history:    Seasonal allergies - fall is worse   No random hives   She had mono in high school. She missed weeks of school.   No known mold exposure - she will have house checked soon  No ticks  She had covid November 2020. It was a bad cold. She had aches and chills. No known long hauler.   No vaccines to covid  No negative responses to any vaccines     Pertinent Family history:   Dad - DM 2, htn, high cholesterol. Father takes care of himself. Father stage 3 kidney failure. He was diagnosed in his late 40s.   Prostate cancer      Mother - seems healthy but does not go to the doctor for wellness       Lifestyle Factors affecting health:   Diet -   May 2023 she decreased processed foods   She is cutting back on junk. 3 meals a day. She is doing more protein.   Chicken, egg whites, fruits, cut back on carbs and starches. More whole foods     She does not drink  She drank a bear and got blotchy.     Exercise -15 month cardio and weights 5 days a week. 30 minutes walking outside or on an incline. She will jog. She will do weights 15-20 pound dumbbells. This is the first time in her life she is doing weight      Stress -   Stress has been higher. Oldest daughter started high school. Mom stress in the morning. Managing stress she exercise that is her release. She will sometimes  journal. She might look into yoga.     Sleep -   Waking up between 1-4a to use the restroom or cannot fall back to sleep. Sleep was better last spring     Supplements:   Probiotic -  Dr. Lakhani's fruitful yield    Zinc  Vitamin c  Vitamin d  Omega 3     HPI: previous rosetta patient      Will get IBS flare here and there, not very often.   Got worse after COVID.   Regular, daily BM.   Eating habits - avoids red meat; veggies, fruits; chicken; pizza on Friday.  Exercise - walking, yoga; classes  Stress levels have settled.   Sleep - ebbs and flows.  Perimenopausal - waking up feeling hot  Periods are shorter and lighter.     Allergies are ok - taking quercetin.  ALLERGIES     Allergies   Allergen Reactions    Z-Antolin [Zithromax] Tightness in Throat        CURRENT MEDICATIONS:     Current Outpatient Medications   Medication Sig Dispense Refill    Probiotic Product (PROBIOTIC DAILY OR) Take by mouth.      Multiple Vitamins-Minerals (MULTI-VITAMIN/MINERALS) Oral Tab Take 1 tablet by mouth daily.      ZINC GLYCINATE OR Take 30 mg by mouth daily.      cholecalciferol 50 MCG (2000 UT) Oral Cap Take 1 capsule (2,000 Units total) by mouth daily.      ascorbic acid 250 MG Oral Tab Take 1 tablet (250 mg total) by mouth daily.      fluticasone propionate 50 MCG/ACT Nasal Suspension 1 spray by Nasal route daily.      Omega-3 Fatty Acids (OMEGA-3 FISH OIL OR) Take 690 mg by mouth daily.         MEDICAL HISTORY:     Past Medical History:    SEASONAL ALLERGIES    VARICELLA       SURGICAL HISTORY:     Past Surgical History:   Procedure Laterality Date    Colonoscopy N/A 9/12/2024    Procedure: COLONOSCOPY;  Surgeon: Fernando Johnston MD;  Location: [facility] MAIN OR    Tonsillectomy  1994       FAMILY HISTORY:      Family History   Problem Relation Age of Onset    Cancer Other         colon cancer    Diabetes Father     Hypertension Father     Heart Disease Father     Cancer Maternal Grandfather         LUNG CANCER    Diabetes  Paternal Grandmother        SOCIAL HISTORY:     Social History     Socioeconomic History    Marital status:    Tobacco Use    Smoking status: Former     Current packs/day: 0.00     Types: Cigarettes     Quit date: 1997     Years since quittin.7    Smokeless tobacco: Never    Tobacco comments:     QUIT 10 YEARS AGO   Vaping Use    Vaping status: Never Used   Substance and Sexual Activity    Alcohol use: Not Currently    Drug use: No    Sexual activity: Yes     Partners: Male     Birth control/protection: Condom     Comment: current 17   Social History Narrative           REVIEW OF SYSTEMS:   Review of Systems     See HPI for pertinent positives and negatives     PHYSICAL EXAM:     Vitals:    24 1005   BP: 110/70   BP Location: Right arm   Patient Position: Sitting   Cuff Size: adult   Pulse: 70   SpO2: 98%   Weight: 132 lb 6.4 oz (60.1 kg)   Height: 5' 1\" (1.549 m)       Physical Exam     Physical Exam  Constitutional:       Appearance: Normal appearance.   Neurological:      General: No focal deficit present.      Mental Status: She is alert and oriented to person, place, and time.   Psychiatric:         Mood and Affect: Mood normal.    ASSESSMENT AND PLAN:     Fasting blood work in luteal phase   Added PMS support   Start ION for gut- may add other supplements at a later date   Monitor how you respond to histamine   Stomach acid test     1. Other fatigue  - Free T3 (Triiodothryronine); Future  - Reverse T3, Serum; Future  - Thyroid Antithyroglobulin AB; Future  - Thyroid Peroxidase (TPO) AB; Future  - Free T4, (Free Thyroxine); Future  - Assay, Thyroid Stim Hormone; Future  - Insulin; Future  - Deamidated Gliadin Abs, IgA; Future  - Deamidated Gliadin Abs, IgG; Future  - Endomysial Antibody IGA; Future  - HLA DQ2/DQ8 (Celiac Disease Genotyping); Future  - Immunoglobulin A/G/M, Quant; Future  - Adult Food Allergy Prof; Future    2. Bloating  - Free T3 (Triiodothryronine); Future  -  Reverse T3, Serum; Future  - Thyroid Antithyroglobulin AB; Future  - Thyroid Peroxidase (TPO) AB; Future  - Free T4, (Free Thyroxine); Future  - Assay, Thyroid Stim Hormone; Future  - Insulin; Future  - Deamidated Gliadin Abs, IgA; Future  - Deamidated Gliadin Abs, IgG; Future  - Endomysial Antibody IGA; Future  - HLA DQ2/DQ8 (Celiac Disease Genotyping); Future  - Immunoglobulin A/G/M, Quant; Future  - Adult Food Allergy Prof; Future    3. Abdominal distress  - Free T3 (Triiodothryronine); Future  - Reverse T3, Serum; Future  - Thyroid Antithyroglobulin AB; Future  - Thyroid Peroxidase (TPO) AB; Future  - Free T4, (Free Thyroxine); Future  - Assay, Thyroid Stim Hormone; Future  - Insulin; Future  - Deamidated Gliadin Abs, IgA; Future  - Deamidated Gliadin Abs, IgG; Future  - Endomysial Antibody IGA; Future  - HLA DQ2/DQ8 (Celiac Disease Genotyping); Future  - Immunoglobulin A/G/M, Quant; Future  - Adult Food Allergy Prof; Future    4. Perimenopause  - Free T3 (Triiodothryronine); Future  - Reverse T3, Serum; Future  - Thyroid Antithyroglobulin AB; Future  - Thyroid Peroxidase (TPO) AB; Future  - Free T4, (Free Thyroxine); Future  - Assay, Thyroid Stim Hormone; Future  - Insulin; Future  - Deamidated Gliadin Abs, IgA; Future  - Deamidated Gliadin Abs, IgG; Future  - Endomysial Antibody IGA; Future  - HLA DQ2/DQ8 (Celiac Disease Genotyping); Future  - Immunoglobulin A/G/M, Quant; Future  - Adult Food Allergy Prof; Future      Time spent with patient: Over 45 minutes spent in chart review and in direct communication with patient obtaining and reviewing history, creating a unique care plan, explaining the rationale for treatment, reviewing potential SE and overall treatment plan,  documenting all clinical information in Epic. Over 50% of this time was in education, counseling and coordination of care.     Problem List Items Addressed This Visit    None  Visit Diagnoses       Other fatigue    -  Primary    Relevant  Orders    Free T3 (Triiodothryronine)    Reverse T3, Serum    Thyroid Antithyroglobulin AB    Thyroid Peroxidase (TPO) AB    Free T4, (Free Thyroxine)    Assay, Thyroid Stim Hormone    Insulin    Deamidated Gliadin Abs, IgA    Deamidated Gliadin Abs, IgG    Endomysial Antibody IGA    HLA DQ2/DQ8 (Celiac Disease Genotyping)    Immunoglobulin A/G/M, Quant    Adult Food Allergy Prof    Bloating        Relevant Orders    Free T3 (Triiodothryronine)    Reverse T3, Serum    Thyroid Antithyroglobulin AB    Thyroid Peroxidase (TPO) AB    Free T4, (Free Thyroxine)    Assay, Thyroid Stim Hormone    Insulin    Deamidated Gliadin Abs, IgA    Deamidated Gliadin Abs, IgG    Endomysial Antibody IGA    HLA DQ2/DQ8 (Celiac Disease Genotyping)    Immunoglobulin A/G/M, Quant    Adult Food Allergy Prof    Abdominal distress        Relevant Orders    Free T3 (Triiodothryronine)    Reverse T3, Serum    Thyroid Antithyroglobulin AB    Thyroid Peroxidase (TPO) AB    Free T4, (Free Thyroxine)    Assay, Thyroid Stim Hormone    Insulin    Deamidated Gliadin Abs, IgA    Deamidated Gliadin Abs, IgG    Endomysial Antibody IGA    HLA DQ2/DQ8 (Celiac Disease Genotyping)    Immunoglobulin A/G/M, Quant    Adult Food Allergy Prof    Perimenopause        Relevant Orders    Free T3 (Triiodothryronine)    Reverse T3, Serum    Thyroid Antithyroglobulin AB    Thyroid Peroxidase (TPO) AB    Free T4, (Free Thyroxine)    Assay, Thyroid Stim Hormone    Insulin    Deamidated Gliadin Abs, IgA    Deamidated Gliadin Abs, IgG    Endomysial Antibody IGA    HLA DQ2/DQ8 (Celiac Disease Genotyping)    Immunoglobulin A/G/M, Quant    Adult Food Allergy Prof             Orders Placed This Visit:  Orders Placed This Encounter   Procedures    Free T3 (Triiodothryronine)    Reverse T3, Serum    Thyroid Antithyroglobulin AB    Thyroid Peroxidase (TPO) AB    Free T4, (Free Thyroxine)    Assay, Thyroid Stim Hormone    Insulin    Deamidated Gliadin Abs, IgA    Deamidated  Gliadin Abs, IgG    Endomysial Antibody IGA    HLA DQ2/DQ8 (Celiac Disease Genotyping)    Immunoglobulin A/G/M, Quant    Adult Food Allergy Prof     No orders of the defined types were placed in this encounter.      Patient Instructions   Blood work during luteal phase     October 7-9 - fasting         helps alleviate minor discomforts associated with the menstrual cycle*  supports healthy progesterone levels*  helps moderate luteinizing hormone production*  PMS Support is a combination of herbal extracts formulated specifically to help women who experience common mild symptoms associated with their monthly menstrual cycle.* The herb Vitex acts on the pituitary gland, which modulates the production of luteinizing hormone.* This supports progesterone production and helps maintain a regular monthly cycle.* Dong Quai, another herb in this formula, acts to relax smooth muscles, relieving minor discomforts associated with the menstrual cycle.*           Suggested Use:  Take 2 vegan capsules daily, or as directed by your healthcare professional      Serving Size: 2 capsules    Amount Per Serving  Vitamin B6 … 50mg  (as pyridoxine HCl)  Chaste Tree Extract … 300mg  (Vitex agnus-castus, fruit, standardized to 0.5% agnusides)  Wild Yam Extract .. 250mg  (Dioscorea villosa, root, 10:1 ratio)  Dong Quai Extract … 250mg  (Tierra sinensis, root, standardized to 1% ligustilide)  Bupleurum Extract … 250mg  (Bupleurum falcatum, root, 10:1 ratio)  Passion Flower Extract … 150mg  (Passiflora incarnata, flower, standardized to 3.5% flavonoids)    Other Ingredients: Hypromellose, Leucine, Silica, Magnesium Citrate.      Store sealed in a cool dry place. Keep out of reach of children. Use only if safety seal is intact.     Warning: If you are pregnant or lactating, taking medication or have a medical condition, consult your healthcare practitioner before use.      Start ION gut support  Future we will probably add other micronutrients      Pay attention to high histamine foods   Does it make your feel bloated, anxious, sleep is upset  Pay attention to sleep the week before     Stomach acid test    First thing in the morning.  1/4 tsp baking soda + 4 ounces of water.  You should burp within 3 minutes if you do not burp it means low stomach acid.     Histamine food list   In general, foods to AVOID:   Cultured or fermented foods - sauerkraut, kombucha, pickles, miso, kimchee  Yeast  Vinegar and foods containing vinegar - salad dressing, mustard, ketchup, mayonnaise  Chocolate and Cocoa  Soda and energy drinks  Canned foods  Preservatives and additives  Leftovers: very ripe, older or non-hygenic foods  Coffee, black and green tea  Spices/seasoning - anise, cinnamon, clove, nutmeg, chili powder, sanchez, hot peppers       Food group Low histamine Minimal High histamine    Protein  Poultry - chicken, duck, pheasant, turkey  -Organic, grass fed and skinless preferred  Frozen     Meat: beef, buffalo, elk, lamb, pork, venison  -Organic, grass fed preferred   frozen -Slow-cooked or leftover meat  -Processed meats: mccoy, sausage, deli meat, etc.  -Smoked or cured: ham, salami, pastrami -Factory-farmed, or with added sugar, MSG, sulfites, or carrageenan    Seafood: sustainable fished and wild caught   -gutted within 30 minutes  Flash-frozen depending upon how quickly fish was gutted -Shellfish  -aged fish (canned, smoked)  -fish not immediately gutted after catching   Eggs Eggs organic & free-range; fully cooked  Egg whites, raw or undercooked   Dairy  Milk  Yogurt    Rice Milk  Kefir- depending on culture used     Cream      Cream Cheese (except when cultured  Aged cheese     Cottage cheese     Fruit - Fresh  Apples (all varieties) Apricots   Blackberries, Blueberries   Cherries  Dates   Figs   Exotic fruits (star fruit, quince)   Grapes (both red & green)   Melon   Nectarines   Peaches   Pears (all varieties) Plums   Pomegranates Raspberries Watermelon    Non-citrus juices Dried Fruit Generally, any overripe fruit   Avocado  Bananas   Citrus fruits   Grapefruit  Kiwi  Lemon/Lime  [place]   Oranges  Papaya  Pineapple Strawberries  Tangerines     Vegetables  Anise/fennel root Artichoke   Arugula  Asparagus   Beets  [place] peppers   [place]ok [place]   Broccoli   [place] sprouts Cabbage   Carrots  Cauliflower  Celery   Cucumber   Eggplant   Garlic   Calista   Green beans  Greens (beet, siobhan, mustard, turnip) Herbs (leafy: basil, mint, parsley, oregano, rosemary, tarragon, thyme) Jicama   Kale   Kohlrabi   Leeks  Lettuce (laury, butter, red)   Mushrooms (all)   Okra   Onion (red)/Shallot Parsnips   Peas (snow, sugar snap)   Pumpkin   Radish   Rutabaga   Rhubarb   Sprouts   Squash (acorn, butternut, delicata, spaghetti, summer) Sweet Potato/Yam Swiss chard Turnip Watercress   Zucchini  Spinach   Tomato (fresh or processed)   Grains  [place]  Oats  Rice  Spelt  Pasta made from corn, rice, spelt  Wheat-based foods   Breads  Yeast-free: muffins & tortillas made from acceptable grains Euro-style rye bread     Sweeteners Agave Honey Maple syrup Non-GMO sugar     Beans Garbanzo  Black  Lentils   Red beans    Fats  - must be 100% grass-fed & organic Cooking Fats:  - Animal fats   -Clarified butter   -Ghee  -Coconut oil  -Extra-virgin Olive oil Eating Fats:   - Coconut   -butter  -Coconut flakes -Olives (all) Nuts & Seeds: Almonds   [place] butter  Brazil nuts   Pecans   Pistachios   -Flax   -Pine nuts   -Pumpkin seeds/ pepitas   -Sesame seeds   -Sunflower seeds   -Sunflower seed butter   -Walnuts Cashews  Coconut milk (canned)  Hazelnuts (Filberts) Macadamia nuts  Avocado   Drinks Herbal Tea   Non citrus fruit juice  water   Green tea   Clear spirits   White wine  Coffee  Black Tea  Soda  Wine   Beer         Return for early november .    Patient affirmed understanding of plan and all questions were answered.     Kaity Nicole PA-C

## 2024-10-08 ENCOUNTER — LAB ENCOUNTER (OUTPATIENT)
Dept: LAB | Facility: REFERENCE LAB | Age: 44
End: 2024-10-08
Attending: PHYSICIAN ASSISTANT
Payer: COMMERCIAL

## 2024-10-08 DIAGNOSIS — R14.0 BLOATING: ICD-10-CM

## 2024-10-08 DIAGNOSIS — R53.83 OTHER FATIGUE: ICD-10-CM

## 2024-10-08 DIAGNOSIS — N95.1 PERIMENOPAUSE: ICD-10-CM

## 2024-10-08 DIAGNOSIS — R10.9 ABDOMINAL DISTRESS: ICD-10-CM

## 2024-10-08 LAB
IGA SERPL-MCNC: 256.9 MG/DL (ref 40–350)
IGM SERPL-MCNC: 47.7 MG/DL (ref 50–300)
IMMUNOGLOBULIN PNL SER-MCNC: 1159 MG/DL (ref 650–1600)
INSULIN SERPL-ACNC: 5.2 MU/L (ref 3–25)
T3FREE SERPL-MCNC: 2.96 PG/ML (ref 2.4–4.2)
T4 FREE SERPL-MCNC: 1.3 NG/DL (ref 0.8–1.7)
THYROGLOB SERPL-MCNC: <15 U/ML (ref ?–60)
THYROPEROXIDASE AB SERPL-ACNC: 45 U/ML (ref ?–60)
TSI SER-ACNC: 1.43 MIU/ML (ref 0.55–4.78)

## 2024-10-08 PROCEDURE — 86258 DGP ANTIBODY EACH IG CLASS: CPT

## 2024-10-08 PROCEDURE — 86376 MICROSOMAL ANTIBODY EACH: CPT

## 2024-10-08 PROCEDURE — 84443 ASSAY THYROID STIM HORMONE: CPT

## 2024-10-08 PROCEDURE — 84439 ASSAY OF FREE THYROXINE: CPT

## 2024-10-08 PROCEDURE — 86800 THYROGLOBULIN ANTIBODY: CPT

## 2024-10-08 PROCEDURE — 82785 ASSAY OF IGE: CPT

## 2024-10-08 PROCEDURE — 81377 HLA II TYPE 1 AG EQUIV LR: CPT

## 2024-10-08 PROCEDURE — 86231 EMA EACH IG CLASS: CPT

## 2024-10-08 PROCEDURE — 84482 T3 REVERSE: CPT

## 2024-10-08 PROCEDURE — 82784 ASSAY IGA/IGD/IGG/IGM EACH: CPT

## 2024-10-08 PROCEDURE — 83525 ASSAY OF INSULIN: CPT

## 2024-10-08 PROCEDURE — 86003 ALLG SPEC IGE CRUDE XTRC EA: CPT

## 2024-10-08 PROCEDURE — 84481 FREE ASSAY (FT-3): CPT

## 2024-10-08 PROCEDURE — 36415 COLL VENOUS BLD VENIPUNCTURE: CPT

## 2024-10-09 LAB
GLIADIN IGA SER-ACNC: 1.2 U/ML (ref ?–7)
GLIADIN IGG SER-ACNC: <0.6 U/ML (ref ?–7)

## 2024-10-10 LAB — ENDOMYSIAL IGA AB: NEGATIVE

## 2024-10-11 LAB
ALLERGEN BRAZIL NUT: <0.1 KUA/L (ref ?–0.1)
ALMOND IGE QN: <0.1 KUA/L (ref ?–0.1)
CASHEW NUT IGE QN: <0.1 KUA/L (ref ?–0.1)
CLAM IGE QN: <0.1 KUA/L (ref ?–0.1)
CODFISH IGE QN: <0.1 KUA/L (ref ?–0.1)
CORN IGE QN: <0.1 KUA/L (ref ?–0.1)
COW MILK IGE QN: <0.1 KUA/L (ref ?–0.1)
EGG WHITE IGE QN: <0.1 KUA/L (ref ?–0.1)
GLUTEN IGE QN: <0.1 KUA/L (ref ?–0.1)
HAZELNUT IGE QN: <0.1 KUA/L (ref ?–0.1)
IGE SERPL-ACNC: 20.9 KU/L (ref 2–214)
PEANUT IGE QN: <0.1 KUA/L (ref ?–0.1)
SALMON IGE QN: <0.1 KUA/L (ref ?–0.1)
SCALLOP IGE QN: <0.1 KUA/L (ref ?–0.1)
SESAME SEED IGE QN: <0.1 KUA/L (ref ?–0.1)
SHRIMP IGE QN: <0.1 KUA/L (ref ?–0.1)
SOYBEAN IGE QN: <0.1 KUA/L (ref ?–0.1)
WALNUT IGE QN: <0.1 KUA/L (ref ?–0.1)
WHEAT IGE QN: <0.1 KUA/L (ref ?–0.1)

## 2024-10-14 LAB — REVERSE T3: 18.2 NG/DL

## 2024-10-16 LAB
DQ2 (DQA1 0501/0505,DQB1 02XX): POSITIVE
DQ8 (DQA1 03XX, DQB1 0302): NEGATIVE

## 2024-11-07 ENCOUNTER — OFFICE VISIT (OUTPATIENT)
Dept: INTEGRATIVE MEDICINE | Facility: CLINIC | Age: 44
End: 2024-11-07
Payer: COMMERCIAL

## 2024-11-07 VITALS
SYSTOLIC BLOOD PRESSURE: 110 MMHG | DIASTOLIC BLOOD PRESSURE: 62 MMHG | HEIGHT: 61 IN | HEART RATE: 76 BPM | WEIGHT: 137.19 LBS | OXYGEN SATURATION: 98 % | BODY MASS INDEX: 25.9 KG/M2

## 2024-11-07 DIAGNOSIS — R14.0 BLOATING: ICD-10-CM

## 2024-11-07 DIAGNOSIS — F41.9 ANXIOUSNESS: ICD-10-CM

## 2024-11-07 DIAGNOSIS — G47.9 SLEEP DISTURBANCE: ICD-10-CM

## 2024-11-07 DIAGNOSIS — N95.1 PERIMENOPAUSE: Primary | ICD-10-CM

## 2024-11-07 DIAGNOSIS — R53.83 OTHER FATIGUE: ICD-10-CM

## 2024-11-07 PROCEDURE — 99215 OFFICE O/P EST HI 40 MIN: CPT | Performed by: PHYSICIAN ASSISTANT

## 2024-11-07 NOTE — PATIENT INSTRUCTIONS
Plan   1)  this will help support your break down of histamine in foods     GI Hist Support  Neurobiologix    1-2 capsules     Serving Size: 2 Vegetable Capsules     Amount Per Serving  Vitamin C ... 200mg  (Ascorbic Acid)  Quercetin ... 100mg  Porcine Kidney ... 300mg  Alpha Lipoic Acid ... 100mg  Stinging Nettle Root 4:1 Extract ... 50mg  (Urtica dioica) (Equivalent to 200mg of Root)  Bromelain ... 100mg  (7 GDU)     2) sleep - this may be helpful to reinitiate sleep   You tube - calming sound baths when you wake up     3) Hormones balance - do not start PMS support we will try a single ingredient supplement     Vitex by Pure Encapsulations         Menstrual Cycle Support: Chaste tree has been associated with supporting breast comfort and positive mood during the menstrual cycle, as suggested by numerous randomized, double blind, placebo-controlled trials. It has also been associated with promoting healthy menstrual luteal phase length and menstrual regularity for some women, helping to maintain healthy reproductive system function.*    Directions: Take 1 tablet twice per day for 3 months      4) stress/anxiety   I recommend a multivitamin and omega to support neurotransmitter function and production.     Ashwagandha to help balance neurotransmitters     The below supplements were sent to you through fullscript  Supplement recommendations:    GI Hist Support (60 capsules) (Neurobiologix): Please take  1-2 Vegetable Capsule x 3 times per day / anytime  ongoing (before each meal 10 minutes as needed ).     Chaste Tree (Vitex) (60 capsules) (Pure Encapsulations): Please take  1 Vegetarian Capsule x once per day / anytime  ongoing (once a day every day ).     Wholemega™ Fish Oil (60 Softgels) (New Chapter): Please take  1 softgel x once per day / anytime  ongoing (immediately before a large meal ).     PhytoMulti® Multivitamin Capsules (60 capsules) (Metagenics): Please take  1 Vegetable Capsule x once per day /  anytime.   Ashwagandha (60 capsules) (Pure Encapsulations): Please take  1 Vegetarian Capsule x once per day / anytime  ongoing (in the morning )

## 2024-11-07 NOTE — PROGRESS NOTES
Qi Tony is a 44 year old female.  Chief Complaint   Patient presents with    Follow - Up     Lab results        HPI:   Qi presents for follow up on fatigue, hair loss, hormones, gut concerns     Updates from last visit:   She started the gut supported. She felt a little bloated/gassy on it. It has settled down.     She has not started the PMS support     She kept track of foods with higher histamine - she has been noticing some things with the foods  Bananas - she will get burpy and bloated   Aged cheeses - feta, blue cheese  Egg white -   Tomatoes /sauces - the next day she feels her skin feels more inflamed.   Coffee  with caffeine - she will be more burpy.   Wheat bread - bloated     Acid test - burped within three minute     Labs   DQ2 positive   IGM- slightly low     Thyroid:   Labs are looking good     Body/skin:   Skin can be more red and irritable since starting perimenopause    Mental State:   Anxiety has not gotten worse - She feels the day to day stuff can be overwhelming. She has the constant mom anxiety   She does not feel scattered   She feels that the last year she will go to the worse extreme     Post partum with her 10 year old who was a more difficult baby. She had minor postpartum     Hormones -   She is on her cycle now. This time around she has not had as much PMS. She had a lighter cycle     Not a lot of hot flashes - they have been improved - will come when PMS is worse     GI -   Bloating and burping is being monitored with histamine foods     Exercise -  She is back on her exercise routine - she was havine foot and joint pain but it is improved with exercise.   Right hip soreness with running - she is doing more power walking     Sleep -   Sleep has been the same waking up 1-4a. She has stuff in her head about the kids     Supplements:   Vitamin D  Ion  Histamine gi- starting  Vitex - starting  Omega- starting a new on  Phytomulti - starting        HPI's FROM PREVIOUS  VISITS    Qi presents for initial evaluation, fatigue, hair loss, hormones, gut concerns   She previously saw Ann    Main concern:   May 1 2023 she has done an overall on her diet and exercise.     Thyroid: no known issue      She is concerned her hair is falling out.     Body/rash: joints are feeling find. Occasionally her knee will hurt its usually activity related.     Not waking up swollen now that she is working out and eating healthy    Fatigue - usually pretty good.   Mid day she will have mid afternoon crash     Hormones - started age 10. Normal they were painful not heavy    Now in her 40s she has had more clotting, sometimes heavy sometimes not.   Bleeding days has shortened. Starts heavy and more clotting. They are regular   Stress can affect periods     2 pregnancies - two living  First child 6 weeks premature.      No fertility issues     She does not think she had post partum     PMS - more irritable   She is not sure if her sleep is worse the week before  She is more headachy before her cycle     Last mammogram She has dense tissue nothing abbormal    Coloscopy - Prep was a challenge. She feels a little better after     GI - She was told she had IBS in her 20s. They believed it was brought on by stress. Daily probiotics helps. She does not have diarrhea. Sometimes it will be loose sometimes, constipation, bloating, burping. Some weeks it is great other weeks it is more bloating. Not a lot of acid reflux     She is not waking up bloated.     When she was young she did struggle a little with constipated but not constant     Mental state -   She feels her anxiety has increased in her life. Regular every day things. She has racing thought, cyclical thoughts.     Anxiety a few days a week. Usually about kids     Weight History:   She has not struggled with her weight   2005 she was her heaviest. Never has she been that heavy. She things that it was food and birth control      May 1  2023 she was 154. She got down to 127    Childhood -   She had  parents age 9 or 10. There was a lot of stress. Traumatic things with that situation. No abuse      Trauma: no abuse, no major accidents    Antibiotic use  She had strep a lot as a child  Tonsils out at age 14.   In her 20s she would have acne she was placed on abx for about a year. IBS started around that time. She was getting yeast infections    She will get sinus infection one time a year     Pertinent medical history:    Seasonal allergies - fall is worse   No random hives   She had mono in high school. She missed weeks of school.   No known mold exposure - she will have house checked soon  No ticks  She had covid November 2020. It was a bad cold. She had aches and chills. No known long hauler.   No vaccines to covid  No negative responses to any vaccines     Pertinent Family history:   Dad - DM 2, htn, high cholesterol. Father takes care of himself. Father stage 3 kidney failure. He was diagnosed in his late 40s.   Prostate cancer      Mother - seems healthy but does not go to the doctor for wellness       Lifestyle Factors affecting health:   Diet -   May 2023 she decreased processed foods   She is cutting back on junk. 3 meals a day. She is doing more protein.   Chicken, egg whites, fruits, cut back on carbs and starches. More whole foods     She does not drink  She drank a bear and got blotchy.     Exercise -15 month cardio and weights 5 days a week. 30 minutes walking outside or on an incline. She will jog. She will do weights 15-20 pound dumbbells. This is the first time in her life she is doing weight      Stress -   Stress has been higher. Oldest daughter started high school. Mom stress in the morning. Managing stress she exercise that is her release. She will sometimes journal. She might look into yoga.     Sleep -   Waking up between 1-4a to use the restroom or cannot fall back to sleep. Sleep was better last spring      Supplements:   Probiotic -  Dr. Lakhani's fruitful yield    Zinc  Vitamin c  Vitamin d  Omega 3       ALLERGIES   Allergies[1]     CURRENT MEDICATIONS:     Current Outpatient Medications   Medication Sig Dispense Refill    Probiotic Product (PROBIOTIC DAILY OR) Take by mouth.      Multiple Vitamins-Minerals (MULTI-VITAMIN/MINERALS) Oral Tab Take 1 tablet by mouth daily.      ZINC GLYCINATE OR Take 30 mg by mouth daily.      Omega-3 Fatty Acids (OMEGA-3 FISH OIL OR) Take 690 mg by mouth daily.      cholecalciferol 50 MCG (2000 UT) Oral Cap Take 1 capsule (2,000 Units total) by mouth daily.      ascorbic acid 250 MG Oral Tab Take 1 tablet (250 mg total) by mouth daily.      fluticasone propionate 50 MCG/ACT Nasal Suspension 1 spray by Nasal route daily.         MEDICAL HISTORY:     Past Medical History:    SEASONAL ALLERGIES    VARICELLA       SURGICAL HISTORY:     Past Surgical History:   Procedure Laterality Date    Colonoscopy N/A 2024    Procedure: COLONOSCOPY;  Surgeon: Fernando Johnston MD;  Location: [facility] MAIN OR    Tonsillectomy         FAMILY HISTORY:      Family History   Problem Relation Age of Onset    Cancer Other         colon cancer    Diabetes Father     Hypertension Father     Heart Disease Father     Cancer Maternal Grandfather         LUNG CANCER    Diabetes Paternal Grandmother        SOCIAL HISTORY:     Social History     Socioeconomic History    Marital status:    Tobacco Use    Smoking status: Former     Current packs/day: 0.00     Types: Cigarettes     Quit date: 1997     Years since quittin.8    Smokeless tobacco: Never    Tobacco comments:     QUIT 10 YEARS AGO   Vaping Use    Vaping status: Never Used   Substance and Sexual Activity    Alcohol use: Not Currently    Drug use: No    Sexual activity: Yes     Partners: Male     Birth control/protection: Condom     Comment: current 17   Social History Narrative           REVIEW OF SYSTEMS:   Review  of Systems     See HPI for pertinent positives and negatives     PHYSICAL EXAM:     Vitals:    11/07/24 0901   BP: 110/62   BP Location: Right arm   Patient Position: Sitting   Cuff Size: adult   Pulse: 76   SpO2: 98%   Weight: 137 lb 3.2 oz (62.2 kg)   Height: 5' 1\" (1.549 m)       Physical Exam       Physical Exam  Constitutional:       Appearance: Normal appearance.   Neurological:      General: No focal deficit present.      Mental Status: She is alert and oriented to person, place, and time.   Psychiatric:         Mood and Affect: Mood normal.    ASSESSMENT AND PLAN:     Plan   1) Histamine support  supplement     2) sleep - this may be helpful to reinitiate sleep   You tube - calming sound baths when you wake up     3) Hormones balance - do not start PMS support we will try a single ingredient supplement     Vitex by Pure Encapsulations     4) stress/anxiety   I recommend a multivitamin and omega to support neurotransmitter function and production.     Ashwagandha to help balance neurotransmitters     The below supplements were sent to you through fullscript  Supplement recommendations:    GI Hist Support (60 capsules) (Neurobiologix): Please take  1-2 Vegetable Capsule x 3 times per day / anytime  ongoing (before each meal 10 minutes as needed ).     Chaste Tree (Vitex) (60 capsules) (Pure Encapsulations): Please take  1 Vegetarian Capsule x once per day / anytime  ongoing (once a day every day ).     Wholemega™ Fish Oil (60 Softgels) (New Chapter): Please take  1 softgel x once per day / anytime  ongoing (immediately before a large meal ).     PhytoMulti® Multivitamin Capsules (60 capsules) (Metagenics): Please take  1 Vegetable Capsule x once per day / anytime.   Ashwagandha (60 capsules) (Pure Encapsulations): Please take  1 Vegetarian Capsule x once per day / anytime  ongoing (in the morning )        1. Other fatigue    2. Bloating    3. Perimenopause    4. Sleep disturbance    5. Anxiousness      Time  spent with patient: Over 45 minutes spent in chart review and in direct communication with patient obtaining and reviewing history, creating a unique care plan, explaining the rationale for treatment, reviewing potential SE and overall treatment plan,  documenting all clinical information in Epic. Over 50% of this time was in education, counseling and coordination of care.     Return for 3-4 months .      Problem List Items Addressed This Visit    None  Visit Diagnoses       Perimenopause    -  Primary    Other fatigue        Bloating        Sleep disturbance        Anxiousness                 Orders Placed This Visit:  No orders of the defined types were placed in this encounter.    No orders of the defined types were placed in this encounter.      Patient Instructions   Plan   1)  this will help support your break down of histamine in foods     GI Hist Support  Neurobiologix    1-2 capsules     Serving Size: 2 Vegetable Capsules     Amount Per Serving  Vitamin C ... 200mg  (Ascorbic Acid)  Quercetin ... 100mg  Porcine Kidney ... 300mg  Alpha Lipoic Acid ... 100mg  Stinging Nettle Root 4:1 Extract ... 50mg  (Urtica dioica) (Equivalent to 200mg of Root)  Bromelain ... 100mg  (7 GDU)     2) sleep - this may be helpful to reinitiate sleep   You tube - calming sound baths when you wake up     3) Hormones balance - do not start PMS support we will try a single ingredient supplement     Vitex by Pure Encapsulations         Menstrual Cycle Support: Chaste tree has been associated with supporting breast comfort and positive mood during the menstrual cycle, as suggested by numerous randomized, double blind, placebo-controlled trials. It has also been associated with promoting healthy menstrual luteal phase length and menstrual regularity for some women, helping to maintain healthy reproductive system function.*    Directions: Take 1 tablet twice per day for 3 months      4) stress/anxiety   I recommend a multivitamin and  omega to support neurotransmitter function and production.     Ashwagandha to help balance neurotransmitters     The below supplements were sent to you through fullscript  Supplement recommendations:    GI Hist Support (60 capsules) (Neurobiologix): Please take  1-2 Vegetable Capsule x 3 times per day / anytime  ongoing (before each meal 10 minutes as needed ).     Chaste Tree (Vitex) (60 capsules) (Pure Encapsulations): Please take  1 Vegetarian Capsule x once per day / anytime  ongoing (once a day every day ).     Wholemega™ Fish Oil (60 Softgels) (New Chapter): Please take  1 softgel x once per day / anytime  ongoing (immediately before a large meal ).     PhytoMulti® Multivitamin Capsules (60 capsules) (Metagenics): Please take  1 Vegetable Capsule x once per day / anytime.   Ashwagandha (60 capsules) (Pure Encapsulations): Please take  1 Vegetarian Capsule x once per day / anytime  ongoing (in the morning )      Return for 3-4 months .    Patient affirmed understanding of plan and all questions were answered.     Kaity Nicole PA-C         [1]   Allergies  Allergen Reactions    Z-Antolin [Zithromax] Tightness in Throat

## 2024-12-01 NOTE — TELEPHONE ENCOUNTER
Attempted to refill the patient's medication.  Unable to do so because [facility] ensure program was not to the office     From: Qi Tony  To: Albert Rick  Sent: 4/2/2024 5:55 PM CDT  Subject: Hello,     I received a text saying I was needing to schedule my colonoscopy. Do you need to put a referral in? I have a doctor who I would prefer, Dr. Fernando Johnston, with [facility]. Do you still need to put an order/referral in for me to schedule this with him?     Thank you  Shiloh

## 2025-06-17 ENCOUNTER — OFFICE VISIT (OUTPATIENT)
Dept: FAMILY MEDICINE CLINIC | Facility: CLINIC | Age: 45
End: 2025-06-17
Payer: COMMERCIAL

## 2025-06-17 VITALS
WEIGHT: 135.63 LBS | HEIGHT: 61 IN | OXYGEN SATURATION: 96 % | HEART RATE: 72 BPM | RESPIRATION RATE: 16 BRPM | TEMPERATURE: 98 F | BODY MASS INDEX: 25.61 KG/M2 | DIASTOLIC BLOOD PRESSURE: 60 MMHG | SYSTOLIC BLOOD PRESSURE: 100 MMHG

## 2025-06-17 DIAGNOSIS — Z71.85 IMMUNIZATION COUNSELING: ICD-10-CM

## 2025-06-17 DIAGNOSIS — E66.3 OVERWEIGHT (BMI 25.0-29.9): ICD-10-CM

## 2025-06-17 DIAGNOSIS — K63.5 HYPERPLASTIC POLYP OF SIGMOID COLON: ICD-10-CM

## 2025-06-17 DIAGNOSIS — D12.6 SESSILE SERRATED POLYP OF COLON: ICD-10-CM

## 2025-06-17 DIAGNOSIS — Z00.00 ROUTINE MEDICAL EXAM: Primary | ICD-10-CM

## 2025-06-17 DIAGNOSIS — K63.5 HYPERPLASTIC POLYP OF TRANSVERSE COLON: ICD-10-CM

## 2025-06-17 DIAGNOSIS — E78.00 HYPERCHOLESTEROLEMIA: ICD-10-CM

## 2025-06-17 PROCEDURE — 99396 PREV VISIT EST AGE 40-64: CPT | Performed by: STUDENT IN AN ORGANIZED HEALTH CARE EDUCATION/TRAINING PROGRAM

## 2025-06-17 NOTE — PROGRESS NOTES
Subjective:   Qi Tony is a 45 year old female who presents for Physical     45-year-old female coming in for her routine physical.  Patient has no acute complaints.  Follows up with GYN for female health matters and already completed mammogram.  Her current GYN retired and she would like to establish with a new gynecologist.    Works out 5 days a week by doing 30 to 40 minutes of power walking on an incline as well as occasional running.  Also does light weights.    Denies any family history of early heart disease, breast or colon cancer.     Chief Complaint Reviewed and Verified  No Further Nursing Notes to   Review  Tobacco Reviewed  Allergies Reviewed  Medications Reviewed    Problem List Reviewed  Medical History Reviewed  Surgical History   Reviewed  OB Status Reviewed  Family History Reviewed  Social History   Reviewed         Tobacco:  She smoked tobacco in the past but quit greater than 12 months ago.  Tobacco Use[1]     Current Medications[2]      Review of Systems:  Review of Systems   Constitutional:  Negative for chills, diaphoresis and fever.   HENT:  Negative for congestion, ear discharge, ear pain, sinus pressure, sinus pain and sore throat.    Eyes:  Negative for pain and discharge.   Respiratory:  Negative for cough, chest tightness, shortness of breath and wheezing.    Cardiovascular:  Negative for chest pain and palpitations.   Gastrointestinal:  Negative for abdominal pain, diarrhea, nausea and vomiting.   Endocrine: Negative for cold intolerance and heat intolerance.   Genitourinary:  Negative for dysuria, flank pain, frequency and urgency.   Musculoskeletal:  Negative for joint swelling.   Skin:  Negative for rash.   Neurological:  Negative for dizziness, syncope and headaches.   Psychiatric/Behavioral:  Negative for confusion and hallucinations.        Objective:   /60 (BP Location: Right arm, Patient Position: Sitting, Cuff Size: adult)   Pulse 72   Temp 98.3 °F  (36.8 °C) (Temporal)   Resp 16   Ht 5' 1\" (1.549 m)   Wt 135 lb 9.6 oz (61.5 kg)   LMP 05/16/2025 (Exact Date)   SpO2 96%   BMI 25.62 kg/m²  Estimated body mass index is 25.62 kg/m² as calculated from the following:    Height as of this encounter: 5' 1\" (1.549 m).    Weight as of this encounter: 135 lb 9.6 oz (61.5 kg).  Physical Exam  Constitutional:       General: She is not in acute distress.     Appearance: Normal appearance. She is not ill-appearing or toxic-appearing.   HENT:      Head: Normocephalic and atraumatic.      Right Ear: Tympanic membrane and ear canal normal.      Left Ear: Tympanic membrane and ear canal normal.      Mouth/Throat:      Mouth: Mucous membranes are moist.      Pharynx: Oropharynx is clear. No oropharyngeal exudate or posterior oropharyngeal erythema.   Eyes:      Extraocular Movements: Extraocular movements intact.      Pupils: Pupils are equal, round, and reactive to light.   Cardiovascular:      Rate and Rhythm: Normal rate and regular rhythm.      Heart sounds: Normal heart sounds. No murmur heard.     No gallop.   Pulmonary:      Effort: Pulmonary effort is normal. No respiratory distress.      Breath sounds: Normal breath sounds. No stridor. No wheezing, rhonchi or rales.   Abdominal:      General: Bowel sounds are normal.      Palpations: Abdomen is soft.      Tenderness: There is no abdominal tenderness. There is no right CVA tenderness, left CVA tenderness or guarding.   Musculoskeletal:         General: No swelling.      Cervical back: Normal range of motion and neck supple. No rigidity or tenderness.      Right lower leg: No edema.      Left lower leg: No edema.   Skin:     General: Skin is warm and dry.   Neurological:      General: No focal deficit present.      Mental Status: She is alert and oriented to person, place, and time. Mental status is at baseline.      Cranial Nerves: No cranial nerve deficit.      Sensory: No sensory deficit.      Motor: Motor function  is intact. No weakness.      Gait: Gait normal.   Psychiatric:         Mood and Affect: Mood normal.         Behavior: Behavior normal.         Thought Content: Thought content normal.         Judgment: Judgment normal.         Assessment & Plan:   1. Routine medical exam (Primary)  -Healthy diet and lifestyle.  -Weight loss.  -Exercise as tolerated.  -Dental exam every 6 months or as recommended by dentist.  -Eye exams annually, at least every 2 years or as recommended by specialist.  -Follows up with GYN for female health matters.  GYN retired and would like to establish care with a new provider.  -     CBC, Platelet; No Differential; Future; Expected date: 06/17/2025  -     Comp Metabolic Panel (14); Future; Expected date: 06/17/2025  -     Hemoglobin A1C; Future; Expected date: 06/17/2025  -     Lipid Panel; Future; Expected date: 06/17/2025  -     TSH W Reflex To Free T4; Future; Expected date: 06/17/2025  -     OBG - INTERNAL  2. Hypercholesterolemia  The patient's ASCVD 10 year risk score is 0.5.  -Healthy diet and lifestyle  -     Lipid Panel; Future; Expected date: 06/17/2025  3. Immunization counseling  Discussed Tdap vaccination with patient.  Patient will consider and follow-up.  4. Hyperplastic polyp of transverse colon  Colonoscopy completed on 9/12/2024.  Advised to repeat in 3 years.  5. Hyperplastic polyp of sigmoid colon  Colonoscopy completed on 9/12/2024.  Advised to repeat in 3 years.  6. Sessile serrated polyp of colon  Colonoscopy completed on 9/12/2024.  Advised to repeat in 3 years.  7. Overweight (BMI 25.0-29.9)  -Healthy diet and lifestyle  -     Hemoglobin A1C; Future; Expected date: 06/17/2025  -     Lipid Panel; Future; Expected date: 06/17/2025        Return in about 1 year (around 6/17/2026) for Routine physical exam visit.    Albert Rick MD, 6/17/2025, 9:55 AM          [1]   Social History  Tobacco Use   Smoking Status Former    Current packs/day: 0.00    Types: Cigarettes    Quit  date: 1997    Years since quittin.4    Passive exposure: Never   Smokeless Tobacco Never   Tobacco Comments    QUIT 10 YEARS AGO   [2]   Current Outpatient Medications   Medication Sig Dispense Refill    Probiotic Product (PROBIOTIC DAILY OR) Take by mouth.      Multiple Vitamins-Minerals (MULTI-VITAMIN/MINERALS) Oral Tab Take 1 tablet by mouth daily.      ZINC GLYCINATE OR Take 30 mg by mouth daily.      Omega-3 Fatty Acids (OMEGA-3 FISH OIL OR) Take 690 mg by mouth daily.      cholecalciferol 50 MCG (2000 UT) Oral Cap Take 1 capsule (2,000 Units total) by mouth daily.      ascorbic acid 250 MG Oral Tab Take 1 tablet (250 mg total) by mouth daily.      fluticasone propionate 50 MCG/ACT Nasal Suspension 1 spray by Nasal route daily.

## 2025-06-28 ENCOUNTER — LAB ENCOUNTER (OUTPATIENT)
Dept: LAB | Facility: REFERENCE LAB | Age: 45
End: 2025-06-28
Attending: STUDENT IN AN ORGANIZED HEALTH CARE EDUCATION/TRAINING PROGRAM
Payer: COMMERCIAL

## 2025-06-28 DIAGNOSIS — E66.3 OVERWEIGHT (BMI 25.0-29.9): ICD-10-CM

## 2025-06-28 DIAGNOSIS — E78.00 HYPERCHOLESTEROLEMIA: ICD-10-CM

## 2025-06-28 DIAGNOSIS — Z00.00 ROUTINE MEDICAL EXAM: ICD-10-CM

## 2025-06-28 LAB
ALBUMIN SERPL-MCNC: 4.4 G/DL (ref 3.2–4.8)
ALBUMIN/GLOB SERPL: 1.8 {RATIO} (ref 1–2)
ALP LIVER SERPL-CCNC: 53 U/L (ref 39–100)
ALT SERPL-CCNC: 13 U/L (ref 10–49)
ANION GAP SERPL CALC-SCNC: 6 MMOL/L (ref 0–18)
AST SERPL-CCNC: 17 U/L (ref ?–34)
BILIRUB SERPL-MCNC: 1.3 MG/DL (ref 0.3–1.2)
BUN BLD-MCNC: 12 MG/DL (ref 9–23)
BUN/CREAT SERPL: 14.3 (ref 10–20)
CALCIUM BLD-MCNC: 9.2 MG/DL (ref 8.7–10.4)
CHLORIDE SERPL-SCNC: 107 MMOL/L (ref 98–112)
CHOLEST SERPL-MCNC: 226 MG/DL (ref ?–200)
CO2 SERPL-SCNC: 25 MMOL/L (ref 21–32)
CREAT BLD-MCNC: 0.84 MG/DL (ref 0.55–1.02)
DEPRECATED RDW RBC AUTO: 39.6 FL (ref 35.1–46.3)
EGFRCR SERPLBLD CKD-EPI 2021: 86 ML/MIN/1.73M2 (ref 60–?)
ERYTHROCYTE [DISTWIDTH] IN BLOOD BY AUTOMATED COUNT: 11.7 % (ref 11–15)
EST. AVERAGE GLUCOSE BLD GHB EST-MCNC: 105 MG/DL (ref 68–126)
FASTING PATIENT LIPID ANSWER: YES
FASTING STATUS PATIENT QL REPORTED: YES
GLOBULIN PLAS-MCNC: 2.4 G/DL (ref 2–3.5)
GLUCOSE BLD-MCNC: 88 MG/DL (ref 70–99)
HBA1C MFR BLD: 5.3 % (ref ?–5.7)
HCT VFR BLD AUTO: 40.5 % (ref 35–48)
HDLC SERPL-MCNC: 82 MG/DL (ref 40–59)
HGB BLD-MCNC: 13.5 G/DL (ref 12–16)
LDLC SERPL CALC-MCNC: 133 MG/DL (ref ?–100)
MCH RBC QN AUTO: 30.5 PG (ref 26–34)
MCHC RBC AUTO-ENTMCNC: 33.3 G/DL (ref 31–37)
MCV RBC AUTO: 91.6 FL (ref 80–100)
NONHDLC SERPL-MCNC: 144 MG/DL (ref ?–130)
OSMOLALITY SERPL CALC.SUM OF ELEC: 285 MOSM/KG (ref 275–295)
PLATELET # BLD AUTO: 210 10(3)UL (ref 150–450)
POTASSIUM SERPL-SCNC: 4.4 MMOL/L (ref 3.5–5.1)
PROT SERPL-MCNC: 6.8 G/DL (ref 5.7–8.2)
RBC # BLD AUTO: 4.42 X10(6)UL (ref 3.8–5.3)
SODIUM SERPL-SCNC: 138 MMOL/L (ref 136–145)
TRIGL SERPL-MCNC: 66 MG/DL (ref 30–149)
TSI SER-ACNC: 1.29 UIU/ML (ref 0.55–4.78)
VLDLC SERPL CALC-MCNC: 12 MG/DL (ref 0–30)
WBC # BLD AUTO: 6.1 X10(3) UL (ref 4–11)

## 2025-06-28 PROCEDURE — 80053 COMPREHEN METABOLIC PANEL: CPT

## 2025-06-28 PROCEDURE — 84443 ASSAY THYROID STIM HORMONE: CPT

## 2025-06-28 PROCEDURE — 36415 COLL VENOUS BLD VENIPUNCTURE: CPT

## 2025-06-28 PROCEDURE — 83036 HEMOGLOBIN GLYCOSYLATED A1C: CPT

## 2025-06-28 PROCEDURE — 80061 LIPID PANEL: CPT

## 2025-06-28 PROCEDURE — 85027 COMPLETE CBC AUTOMATED: CPT

## 2025-06-30 ENCOUNTER — PATIENT MESSAGE (OUTPATIENT)
Dept: FAMILY MEDICINE CLINIC | Facility: CLINIC | Age: 45
End: 2025-06-30